# Patient Record
Sex: FEMALE | Race: WHITE | NOT HISPANIC OR LATINO | Employment: STUDENT | URBAN - METROPOLITAN AREA
[De-identification: names, ages, dates, MRNs, and addresses within clinical notes are randomized per-mention and may not be internally consistent; named-entity substitution may affect disease eponyms.]

---
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
## 2019-01-03 ENCOUNTER — OFFICE VISIT (OUTPATIENT)
Dept: OBGYN | Facility: CLINIC | Age: 19
End: 2019-01-03
Payer: COMMERCIAL

## 2019-01-03 VITALS — SYSTOLIC BLOOD PRESSURE: 98 MMHG | WEIGHT: 127 LBS | DIASTOLIC BLOOD PRESSURE: 60 MMHG | BODY MASS INDEX: 22.32 KG/M2

## 2019-01-03 DIAGNOSIS — Z00.00 WELL WOMAN EXAM WITHOUT GYNECOLOGICAL EXAM: Primary | ICD-10-CM

## 2019-01-03 DIAGNOSIS — N94.6 DYSMENORRHEA: ICD-10-CM

## 2019-01-03 DIAGNOSIS — Z11.3 ROUTINE SCREENING FOR STI (SEXUALLY TRANSMITTED INFECTION): ICD-10-CM

## 2019-01-03 DIAGNOSIS — Z30.41 ENCOUNTER FOR SURVEILLANCE OF CONTRACEPTIVE PILLS: ICD-10-CM

## 2019-01-03 PROCEDURE — 99385 PREV VISIT NEW AGE 18-39: CPT | Performed by: ADVANCED PRACTICE MIDWIFE

## 2019-01-03 PROCEDURE — 87491 CHLMYD TRACH DNA AMP PROBE: CPT | Performed by: ADVANCED PRACTICE MIDWIFE

## 2019-01-03 PROCEDURE — 87591 N.GONORRHOEAE DNA AMP PROB: CPT | Performed by: ADVANCED PRACTICE MIDWIFE

## 2019-01-03 NOTE — NURSING NOTE
"Chief Complaint   Patient presents with     Contraception     refill       Initial BP 98/60 (BP Location: Right arm, Cuff Size: Adult Regular)   Wt 57.6 kg (127 lb)   LMP 2018   BMI 22.32 kg/m   Estimated body mass index is 22.32 kg/m  as calculated from the following:    Height as of 18: 1.607 m (5' 3.25\").    Weight as of this encounter: 57.6 kg (127 lb).  BP completed using cuff size: regular    Questioned patient about current smoking habits.  Pt. has never smoked.          The following HM Due: NONE      Laury Pizano CMA                "

## 2019-01-03 NOTE — PROGRESS NOTES
Anushka is a 19 year old  female who presents for annual exam.     Besides routine health maintenance, she has no other health concerns today .  HPI:  Here for annual exam. Requesting KAYLEIGH renewal, planning to study abroad for 6 mo.  No other health concerns.   Menses are regular q 28-30 days and normal lasting 4- 5 days.   Menses flow: normal.    Patient's last menstrual period was 2018..   Using oral contraceptives for contraception.    She is not currently considering pregnancy.    REPRODUCTIVE/GYNECOLOGIC HISTORY:  Anushka is sexually active with male partner(s) and is currently in monogamous relationship.   STI testing offered?  Accepted  History of abnormal Pap smear:  No  SOCIAL HISTORY  Abuse: does not report having previously been physical or sexually abused.    Do you feel safe in your environment? YES    She  reports that  has never smoked. she has never used smokeless tobacco.      Last PHQ-9 score on record =   PHQ-9 SCORE 2018   PHQ-9 Total Score 0     Last GAD7 score on record = No flowsheet data found.  Alcohol Score = RARE     HEALTH MAINTENANCE:  Cholesterol: (No results found for: CHOL History of abnormal lipids: No  Mammo: na . History of abnormal Mammo: Not applicable.  Regular Self Breast Exams: Yes  TSH: (No results found for: TSH )  Pap; (No results found for: PAP )  Immunizations up to date: no, declines flu   (Gardasil, Tdap, Flu)  Health maintenance updated:  yes    HEALTHY HABITS  Eating habits: follows a balanced nutrition diet  Calcium/Vitamin D intake: source:  dairy   Exercise: How often do you exercise? 1-3 times/week;Walking, Cardio and Strength Training  Have you had an eye exam in the last two years? YES  Do you routinely see the dentist once or twice yearly? YES      HISTORY:  Obstetric History       T0      L0     SAB0   TAB0   Ectopic0   Multiple0   Live Births0         Past Medical History:   Diagnosis Date     NO ACTIVE PROBLEMS      No past  surgical history on file.  Family History   Problem Relation Age of Onset     Family History Negative Mother      Depression Father      Diabetes Father         type 2 dx age 44     Social History     Socioeconomic History     Marital status: Single     Spouse name: None     Number of children: None     Years of education: None     Highest education level: None   Social Needs     Financial resource strain: None     Food insecurity - worry: None     Food insecurity - inability: None     Transportation needs - medical: None     Transportation needs - non-medical: None   Occupational History     None   Tobacco Use     Smoking status: Never Smoker     Smokeless tobacco: Never Used   Substance and Sexual Activity     Alcohol use: No     Drug use: No     Sexual activity: Yes     Birth control/protection: Condom, Pill     Comment: carola may 2018   Other Topics Concern     Parent/sibling w/ CABG, MI or angioplasty before 65F 55M? Not Asked   Social History Narrative     None       Current Outpatient Medications:      Acetaminophen (TYLENOL PO), Take  by mouth., Disp: , Rfl:      norethindrone-ethinyl estradiol (BALZIVA) 0.4-35 MG-MCG per tablet, Take 1 tablet by mouth daily, Disp: 84 tablet, Rfl: 0     Allergies   Allergen Reactions     No Known Allergies        Past medical, surgical, social and family history were reviewed and updated in [facility].    ROS:   CONSTITUTIONAL: NEGATIVE for fever, chills, change in weight  INTEGUMENTARU/SKIN: NEGATIVE for worrisome rashes, moles or lesions  EYES: NEGATIVE for vision changes or irritation  ENT: NEGATIVE for ear, mouth and throat problems  RESP: NEGATIVE for significant cough or SOB  BREAST: NEGATIVE for masses, tenderness or discharge  CV: NEGATIVE for chest pain, palpitations or peripheral edema  GI: NEGATIVE for nausea, abdominal pain, heartburn, or change in bowel habits  : NEGATIVE for unusual urinary or vaginal symptoms. Periods are regular.  MUSCULOSKELETAL: NEGATIVE for  significant arthralgias or myalgia  NEURO: NEGATIVE for weakness, dizziness or paresthesias  PSYCHIATRIC: NEGATIVE for changes in mood or affect    PHYSICAL EXAM:  BP 98/60 (BP Location: Right arm, Cuff Size: Adult Regular)   Wt 57.6 kg (127 lb)   LMP 12/24/2018   BMI 22.32 kg/m     BMI: Body mass index is 22.32 kg/m .  Constitutional: healthy, alert and no distress  Neck: symmetrical, thyroid normal size, no masses present, no lymphadenopathy present.   Cardiovascular: RRR, no murmurs present  Respiratory: breathing unlabored, lungs CTA bilaterally  Breast: deferred   Gastrointestinal: abdomen soft, non-tender, bowel sounds present  PELVIC EXAM:  Deferred     ASSESSMENT/PLAN:    Return to clinic 1 year for well woman exam.   (Z00.00) Well woman exam without gynecological exam  (primary encounter diagnosis)  Comment: wnl  Plan: return to clinic 1 year     (Z11.3) Routine screening for STI (sexually transmitted infection)  Comment: wnl  Plan: Chlamydia trachomatis PCR, Neisseria         gonorrhoeae PCR        Results pending     (N94.6) Dysmenorrhea  Comment: refilled   Plan: norethindrone-ethinyl estradiol (BALZIVA)         0.4-35 MG-MCG tablet            (Z30.41) Encounter for surveillance of contraceptive pills  Comment: stable on current KAYLEIGH, not interested in changing methods   Plan: refilled   -6 mo prescription given for studying abroad, instructed to call clinic for refill for following 6 mo once she returns       COUNSELING:   Reviewed preventive health counseling, as reflected in patient instructions       Regular exercise       Healthy diet/nutrition   reports that  has never smoked. she has never used smokeless tobacco.      Karin Ma, DNP, APRN, CNM

## 2019-04-05 DIAGNOSIS — N94.6 DYSMENORRHEA: ICD-10-CM

## 2019-04-05 NOTE — TELEPHONE ENCOUNTER
balziva      Last Written Prescription Date:  1/3/19  Last Fill Quantity: 168,   # refills: 0  Last Office Visit: 1/3/19  Future Office visit:

## 2019-04-05 NOTE — TELEPHONE ENCOUNTER
"Requested Prescriptions   Pending Prescriptions Disp Refills     norethindrone-ethinyl estradiol (BALZIVA) 0.4-35 MG-MCG tablet 168 tablet 1     Sig: Take 1 tablet by mouth daily    Contraceptives Protocol Passed - 4/5/2019  1:39 PM       Passed - Patient is not a current smoker if age is 35 or older       Passed - Recent (12 mo) or future (30 days) visit within the authorizing provider's specialty    Patient had office visit in the last 12 months or has a visit in the next 30 days with authorizing provider or within the authorizing provider's specialty.  See \"Patient Info\" tab in inbasket, or \"Choose Columns\" in Meds & Orders section of the refill encounter.             Passed - Medication is active on med list       Passed - No active pregnancy on record       Passed - No positive pregnancy test in past 12 months        Filled per prtocol.    Kaitlyn HERNANDEZ R.N.  [facility]    "

## 2020-02-10 ENCOUNTER — HEALTH MAINTENANCE LETTER (OUTPATIENT)
Age: 20
End: 2020-02-10

## 2020-03-13 ENCOUNTER — OFFICE VISIT (OUTPATIENT)
Dept: OBGYN | Facility: CLINIC | Age: 20
End: 2020-03-13
Payer: COMMERCIAL

## 2020-03-13 VITALS — DIASTOLIC BLOOD PRESSURE: 70 MMHG | BODY MASS INDEX: 23.2 KG/M2 | WEIGHT: 132 LBS | SYSTOLIC BLOOD PRESSURE: 112 MMHG

## 2020-03-13 DIAGNOSIS — Z01.419 WELL WOMAN EXAM WITH ROUTINE GYNECOLOGICAL EXAM: Primary | ICD-10-CM

## 2020-03-13 DIAGNOSIS — N94.6 DYSMENORRHEA: ICD-10-CM

## 2020-03-13 DIAGNOSIS — Z30.09 BIRTH CONTROL COUNSELING: ICD-10-CM

## 2020-03-13 PROCEDURE — 99395 PREV VISIT EST AGE 18-39: CPT | Performed by: ADVANCED PRACTICE MIDWIFE

## 2020-03-13 RX ORDER — NORETHINDRONE AND ETHINYL ESTRADIOL 0.4-0.035
1 KIT ORAL DAILY
Qty: 84 TABLET | Refills: 3 | Status: SHIPPED | OUTPATIENT
Start: 2020-03-13 | End: 2020-05-29

## 2020-03-13 NOTE — PROGRESS NOTES
Anushka is a 20 year old  female who presents for annual exam.     Besides routine health maintenance,  she would like to discuss birth control options.    HPI: Here for annual. On COCs but having side effects. Would like to discuss IUD options. Due for 1st pap.   Did receive the HPV vaccine series.       GYNECOLOGIC HISTORY:    No LMP recorded.    Regular menses? yes  Menses every 28 days.  Length of menses: 3 days, reports light. Reports before birth control a little heavier with cramping.   Her current contraception method is: oral contraceptives.  She  reports that she has never smoked. She has never used smokeless tobacco.    Patient is sexually active.  STD testing offered?  Accepted   Last PHQ-9 score on record =   PHQ-9 SCORE 2018   PHQ-9 Total Score 0     Last GAD7 score on record = No flowsheet data found.  Alcohol Score = 1 time per week     HEALTH MAINTENANCE:  Cholesterol: (No results found for: CHOL   Last Mammo: Not applicable, Result: Not applicable,   Pap: (No results found for: PAP )  Due       Health maintenance updated:  yes    HISTORY:  OB History    Para Term  AB Living   0 0 0 0 0 0   SAB TAB Ectopic Multiple Live Births   0 0 0 0 0       Patient Active Problem List   Diagnosis     Encounter for other contraceptive management for dysmenorrhea     Dysmenorrhea     Screen for STD (sexually transmitted disease)     No past surgical history on file.   Social History     Tobacco Use     Smoking status: Never Smoker     Smokeless tobacco: Never Used   Substance Use Topics     Alcohol use: No      Problem (# of Occurrences) Relation (Name,Age of Onset)    Depression (1) Father    Diabetes (1) Father: type 2 dx age 44    Family History Negative (1) Mother            Current Outpatient Medications   Medication Sig     Acetaminophen (TYLENOL PO) Take  by mouth.     norethindrone-ethinyl estradiol (BALZIVA) 0.4-35 MG-MCG tablet Take 1 tablet by mouth daily     No current  facility-administered medications for this visit.      Allergies   Allergen Reactions     No Known Allergies        Past medical, surgical, social and family histories were reviewed and updated in [facility].    ROS:   12 point review of systems negative other than symptoms noted below or in the HPI.      EXAM:  There were no vitals taken for this visit.   BMI: There is no height or weight on file to calculate BMI.    PHYSICAL EXAM:  Constitutional:   Appearance: Well nourished, well developed, alert, in no acute distress  Neck:  Lymph Nodes:  No lymphadenopathy present    Thyroid:  Gland size normal, nontender, no nodules or masses present  on palpation  Chest:  Respiratory Effort:  Breathing unlabored  Cardiovascular:    Heart: Auscultation:  Regular rate, normal rhythm, no murmurs present  Breasts: Inspection of Breasts:  No lymphadenopathy present., Palpation of Breasts and Axillae:  No masses present on palpation, no breast tenderness. and Axillary Lymph Nodes:  No lymphadenopathy present.  Gastrointestinal:   Abdominal Examination:  Abdomen nontender to palpation, tone normal without rigidity or guarding, no masses present, umbilicus without lesions   Liver and Spleen:  No hepatomegaly present, liver nontender to palpation    Hernias:  No hernias present  Lymphatic: Lymph Nodes:  No other lymphadenopathy present  Skin:  General Inspection:  No rashes present, no lesions present, no areas of  discoloration  Neurologic:    Mental Status:  Oriented X3.  Normal strength and tone, sensory exam                grossly normal, mentation intact and speech normal.    Psychiatric:   Mentation appears normal and affect normal/bright.         No Pelvic Exam performed    COUNSELING:   Reviewed preventive health counseling, as reflected in patient instructions       Contraception    BMI: There is no height or weight on file to calculate BMI.      ASSESSMENT:  20 year old female with satisfactory annual exam.  (Z01.419) Well woman  exam with routine gynecological exam  (primary encounter diagnosis)  Comment: wnl  Plan: norethindrone-ethinyl estradiol (BALZIVA)         0.4-35 MG-MCG tablet, CANCELED: Pap imaged thin        layer screen only - recommended age 21 - 24         years         (N94.6) Dysmenorrhea  Plan: norethindrone-ethinyl estradiol (BALZIVA)         0.4-35 MG-MCG tablet        Refilled     (Z30.09) Birth control counseling  Comment:   -Discussed IUD options, reviewed hormonal vs non hormonal, risks, benefits, side effects and procedure.   -Briefly discussed Nexplanon, Depo, Nuva Ring.     Plan:  -Refill KAYLEIGH today until IUD insertion   -Handouts given for Mirena, Kyleena and patricia. Patient most interested in patricia.   -return to clinic for IUD insertion premedicated ibuprofen   -Pap and STI screening not completed today, will complete at IUD insertion Patient agrees with plan       SARKIS Jimenes CNM

## 2020-03-13 NOTE — NURSING NOTE
"Chief Complaint   Patient presents with     Physical     Contraception     Discuss birth control options, possibly an IUD       Initial /70 (BP Location: Right arm, Cuff Size: Adult Regular)   Wt 59.9 kg (132 lb)   LMP 03/10/2020 (Exact Date)   Breastfeeding No   BMI 23.20 kg/m   Estimated body mass index is 23.2 kg/m  as calculated from the following:    Height as of 18: 1.607 m (5' 3.25\").    Weight as of this encounter: 59.9 kg (132 lb).  BP completed using cuff size: regular    Questioned patient about current smoking habits.  Pt. has never smoked.          The following HM Due: pap smear      Spencer Clements CMA    "

## 2020-05-29 ENCOUNTER — OFFICE VISIT (OUTPATIENT)
Dept: OBGYN | Facility: CLINIC | Age: 20
End: 2020-05-29
Payer: COMMERCIAL

## 2020-05-29 DIAGNOSIS — Z30.430 ENCOUNTER FOR INSERTION OF INTRAUTERINE CONTRACEPTIVE DEVICE: Primary | ICD-10-CM

## 2020-05-29 DIAGNOSIS — Z30.430 ENCOUNTER FOR IUD INSERTION: ICD-10-CM

## 2020-05-29 DIAGNOSIS — Z12.4 PAPANICOLAOU SMEAR FOR CERVICAL CANCER SCREENING: ICD-10-CM

## 2020-05-29 DIAGNOSIS — Z11.3 ROUTINE SCREENING FOR STI (SEXUALLY TRANSMITTED INFECTION): ICD-10-CM

## 2020-05-29 PROCEDURE — 87491 CHLMYD TRACH DNA AMP PROBE: CPT | Performed by: ADVANCED PRACTICE MIDWIFE

## 2020-05-29 PROCEDURE — G0145 SCR C/V CYTO,THINLAYER,RESCR: HCPCS | Performed by: ADVANCED PRACTICE MIDWIFE

## 2020-05-29 PROCEDURE — 87591 N.GONORRHOEAE DNA AMP PROB: CPT | Performed by: ADVANCED PRACTICE MIDWIFE

## 2020-05-29 PROCEDURE — G0124 SCREEN C/V THIN LAYER BY MD: HCPCS | Performed by: ADVANCED PRACTICE MIDWIFE

## 2020-05-29 PROCEDURE — 58300 INSERT INTRAUTERINE DEVICE: CPT | Performed by: ADVANCED PRACTICE MIDWIFE

## 2020-05-29 NOTE — PATIENT INSTRUCTIONS
What Kyleena/Mirena Users May Expect    What to watch for right after Kyleena/Mirena is placed  Some women may experience uterine cramps, bleeding, and/or dizziness during and right after Mirena is placed. To help minimize the cramps, you may taken ibuprofen 600 mg with food prior to your appointment. These symptoms should improve over the next 24 hours.  Mild cramping may be present for a few days after your placement  As a follow up, you should check your strings on 4 weeks or visit your clinic once in the first 4 to 12 weeks after Kyleena/Mirena is placed to make sure it is in the right position. After that, Kyleena/Mirena can be checked once a year as part of your routine exam.    Please use a back-up method (abstinence or condoms) for 5 days after placement.    Your periods may change  For the first 3 to 6 months, your monthly period may become irregular. You may also have frequent spotting or light bleeding. A few women have heavy bleeding during this time. After your body adjusts, the number of bleeding days is likely to decrease (but may remain irregular), and you may even find that your periods stop altogether for as long as Kyleena/Mirena is in place. Around the end of the third month of use, you may see up to a 75% reduction in the amount of menstrual bleeding. By one year, about 1 out of 5 users may hay have no period at all. At the end of two years, 70% have little or no bleeding. Your periods will return rapidly once Kyleena/Mirena is removed.     Mirena Strings  You may check your own Kyleena/Mirena strings by inserting a finger into the vagina and feeling the strings as they exit the cervix.  The strings will initially feel firm, like fishing line, but will soften over a few weeks.  After the strings have softened, you or your partner should not be able to feel the strings during intercourse.  If you can feel the IUD, see your healthcare provider to have the position confirmed.  You may use tampons  with Kyleena/Mirena in place.    Kyleena/Mirena does not protect against HIV or STDs.  Kyleena/Mirena does not prevent the formation of ovarian cysts.  Kyleena/Mirena does not typically reduce acne or cause weight gain or mood changes.      For more information:  [email or web address]

## 2020-05-29 NOTE — NURSING NOTE
"Chief Complaint   Patient presents with     Contraception     Kyleena IUD insertion       Initial /78 (BP Location: Left arm, Cuff Size: Adult Regular)   Wt 81.2 kg (179 lb)   LMP 2020 (Exact Date)   Breastfeeding No   BMI 31.46 kg/m   Estimated body mass index is 31.46 kg/m  as calculated from the following:    Height as of 18: 1.607 m (5' 3.25\").    Weight as of this encounter: 81.2 kg (179 lb).  BP completed using cuff size: regular    Questioned patient about current smoking habits.  Pt. has never smoked.          The following HM Due: pap smear  Thuan ()    Spencer Clements CMA    "

## 2020-06-02 ENCOUNTER — PATIENT OUTREACH (OUTPATIENT)
Dept: OBGYN | Facility: CLINIC | Age: 20
End: 2020-06-02

## 2020-06-02 LAB
COPATH REPORT: ABNORMAL
PAP: ABNORMAL

## 2020-06-02 NOTE — PROGRESS NOTES
IUD Insertion:  CONSULT:    Is a pregnancy test required: No.  Was a consent obtained?  Yes    Subjective: Anushka Lewis is a 20 year old  presents for IUD as counseled during her annual exam this past March. After a brief review of different types of IUDs, patient desires Kyleena.    Patient has been given the opportunity to ask questions about all forms of birth control, including all options appropriate for Anushka Lewis. Discussed that no method of birth control, except abstinence is 100% effective against pregnancy or sexually transmitted infection.     Anushka Lewis understands she may have the IUD removed at any time. IUD should be removed by a health care provider.    The entire insertion procedure was reviewed with the patient, including care after placement.    Patient's last menstrual period was 2020 (exact date). Last sexual activity: 20. Last dose of active birth control pill: 20. Currently on placebo week. No allergy to betadine or shellfish. Patient desires STD screening. Also due for first Pap today.  No results found for: HCG      /78 (BP Location: Left arm, Cuff Size: Adult Regular)   Wt 81.2 kg (179 lb)   LMP 2020 (Exact Date)   Breastfeeding No   BMI 31.46 kg/m      Pelvic Exam:   EG/BUS: Normal genital architecture without lesions, erythema or abnormal secretions.   Vagina: Moist, pink, rugae with physiologic discharge and secretions  Cervix: Nulliparous, pink, moist, without lesion or CMT  Uterus: Midposition, mobile, no pain  Adnexa: within normal limits and no masses, nodularity, tenderness    PROCEDURE NOTE: -- IUD Insertion    Reason for Insertion: contraception    Patient premedicated with ibuprofen prior to today's appointment.  Under sterile technique, cervix was visualized with speculum and prepped with Betadine solution swab x 3. Tenaculum was placed for stability. The uterus was gently straightened and sounded to 7.0 cm. IUD prepared  for placement, and IUD inserted according to 's instructions without difficulty or significant resitance, and deployed at the fundus. The strings were visualized and trimmed to 3.0 cm from the external os. Tenaculum was removed and hemostasis noted. Speculum removed.  Patient tolerated procedure well.    Lot # [id number]  Exp: FEB 2022    EBL: minimal    Complications: none    ASSESSMENT:     ICD-10-CM    1. Encounter for insertion of intrauterine contraceptive device  Z30.430 levonorgestrel (KYLEENA) 19.5 MG IUD     levonorgestrel (KYLEENA) 19.5 MG IUD 19.5 mg     INSERTION INTRAUTERINE DEVICE     levonorgestrel (KYLEENA) 19.5 MG IUD     levonorgestrel (KYLEENA) 19.5 MG IUD 19.5 mg   2. Routine screening for STI (sexually transmitted infection)  Z11.3 NEISSERIA GONORRHOEA PCR     CHLAMYDIA TRACHOMATIS PCR   3. Papanicolaou smear for cervical cancer screening  Z12.4 Pap imaged thin layer screen only - recommended age 21 - 24 years   4. Kyleena IUD inserted: 5/29/2020; Removal: 5/29/2025  Z30.430         PLAN:    Patient given handouts, including when to have IUD removed, list of danger s/sx, side effects and follow up recommended. Encouraged condom use for prevention of STD. Advised to call for any fever, for prolonged or severe pain or bleeding, abnormal vaginal discharge, or unable to palpate strings. She was advised to use pain medications (ibuprofen) as needed for mild to moderate pain. Advised to follow-up in clinic in 4-6 weeks for IUD string check if unable to find strings or as directed by provider.     SARKIS Monsivais CNM

## 2020-06-02 NOTE — TELEPHONE ENCOUNTER
5/29/20 ASCUS Pap at 21 yo. Plan pap only in 1 year.   Result released to [employer].   Amber Stewart RN  Pap Tracking

## 2020-11-16 ENCOUNTER — HEALTH MAINTENANCE LETTER (OUTPATIENT)
Age: 20
End: 2020-11-16

## 2020-12-29 ENCOUNTER — OFFICE VISIT (OUTPATIENT)
Dept: DERMATOLOGY | Facility: CLINIC | Age: 20
End: 2020-12-29
Payer: COMMERCIAL

## 2020-12-29 VITALS — DIASTOLIC BLOOD PRESSURE: 70 MMHG | OXYGEN SATURATION: 99 % | SYSTOLIC BLOOD PRESSURE: 104 MMHG | HEART RATE: 65 BPM

## 2020-12-29 DIAGNOSIS — L91.0 KELOID CICATRIX: ICD-10-CM

## 2020-12-29 DIAGNOSIS — D48.5 NEOPLASM OF UNCERTAIN BEHAVIOR OF SKIN: Primary | ICD-10-CM

## 2020-12-29 PROCEDURE — 11900 INJECT SKIN LESIONS </W 7: CPT | Mod: 51 | Performed by: PHYSICIAN ASSISTANT

## 2020-12-29 PROCEDURE — 99203 OFFICE O/P NEW LOW 30 MIN: CPT | Mod: 25 | Performed by: PHYSICIAN ASSISTANT

## 2020-12-29 PROCEDURE — 11102 TANGNTL BX SKIN SINGLE LES: CPT | Performed by: PHYSICIAN ASSISTANT

## 2020-12-29 PROCEDURE — 88305 TISSUE EXAM BY PATHOLOGIST: CPT | Performed by: DERMATOLOGY

## 2020-12-29 NOTE — PROGRESS NOTES
HPI:   Chief complaints: Anushka Lewis is a 20 year old female who presents for evaluation of a keloid scar on the left ear lobe. The area has been present for about 1 year and is irritated. It occurred after an ear piercing. She has never had keloids in the past.     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 She will be getting her masters in nursing in the future.     Review Of Systems  Eyes: negative  Ears/Nose/Throat: negative  Respiratory: No shortness of breath, dyspnea on exertion, cough, or hemoptysis  Cardiovascular: negative  Gastrointestinal: negative  Genitourinary: negative  Musculoskeletal: negative  Neurologic: negative  Psychiatric: negative  Skin: positive for lesion      PHYSICAL EXAM:    /70   Pulse 65   LMP 12/28/2020 (Exact Date)   SpO2 99%   Skin exam performed as follows: Type 2 skin. Mood appropriate  Alert and Oriented X 3. Well developed, well nourished in no distress.  General appearance: Normal  Head including face: Normal  Eyes: conjunctiva and lids: Normal  Mouth: Lips, teeth, gums: Normal  Neck: Normal  Chest-breast/axillae: Normal  Back: Normal  Spleen and liver: Normal  Cardiovascular: Exam of peripheral vascular system by observation for swelling, varicosities, edema: Normal  Genitalia: groin, buttocks: Normal  Extremities: digits/nails (clubbing): Normal  Eccrine and Apocrine glands: Normal  Right upper extremity: Normal  Left upper extremity: Normal  Right lower extremity: Normal  Left lower extremity: Normal  Skin: Scalp and body hair: See below    1. Firm pink papule on the left posterior ear lobe 8 mm in size   2. Small hypertrophic papule on the right ear lobe    ASSESSMENT/PLAN:     1. Button keloid cicatrix on the left posterior ear lobe - discussed excision with ILK and she is amenable to this. Discussed returning every 2-4 weeks for ILK injections to ensure keloid does not recur.   --Kenalog  10 mg/cc injected to ear lobe. Total of 0.5 cc's used.  Advised on risk of atrophy and failure to respond. Advised on aftercare.   --Shave biopsy performed.  Area cleaned and anesthetized with 1% lidocaine with epinephrine.  Dermablade used to remove the lesion and sent to pathology. Bleeding was cauterized. Pt tolerated procedure well with no complications.     2. Small cicatrix on the right ear lobe - not bothersome to patient        Follow-up: 2 weeks  CC:   Scribed By: Deidre Augustin MS, PAMichelleC

## 2020-12-29 NOTE — PATIENT INSTRUCTIONS
Wound Care Instructions     FOR SUPERFICIAL WOUNDS     [facility] [phone]                 AFTER 24 HOURS YOU SHOULD REMOVE THE BANDAGE AND BEGIN DAILY DRESSING CHANGES AS FOLLOWS:     1) Remove Dressing.     2) Clean and dry the area with tap water using a Q-tip or sterile gauze pad.     3) Apply Vaseline, Aquaphor, Polysporin ointment or Bacitracin ointment over entire wound.  Do NOT use Neosporin ointment.     4) Cover the wound with a band-aid, or a sterile non-stick gauze pad and micropore paper tape    REPEAT THESE INSTRUCTIONS AT LEAST ONCE A DAY UNTIL THE WOUND HAS COMPLETELY HEALED.    It is an old wives tale that a wound heals better when it is exposed to air and allowed to dry out. The wound will heal faster with a better cosmetic result if it is kept moist with ointment and covered with a bandage.    **Do not let the wound dry out.**    Supplies Needed:      *Cotton tipped applicators (Q-tips)    *Vaseline, Aquaphor, Polysporin or Bacitracin Ointment (NOT NEOSPORIN)    *Band-aids or non-stick gauze pads and micropore paper tape.      PATIENT INFORMATION:    During the healing process you will notice a number of changes. All wounds develop a small halo of redness surrounding the wound.  This means healing is occurring. Severe itching with extensive redness usually indicates sensitivity to the ointment or bandage tape used to dress the wound.  You should call our office if this develops.      Swelling  and/or discoloration around your surgical site is common, particularly when performed around the eye.    All wounds normally drain.  The larger the wound the more drainage there will be.  After 7-10 days, you will notice the wound beginning to shrink and new skin will begin to grow.  The wound is healed when you can see skin has formed over the entire area.  A healed wound has a healthy, shiny look to the surface and is red to dark pink in color to normalize.  Wounds may take approximately  4-6 weeks to heal.  Larger wounds may take 6-8 weeks.  After the wound is healed you may discontinue dressing changes.    You may experience a sensation of tightness as your wound heals. This is normal and will gradually subside.    Your healed wound may be sensitive to temperature changes. This sensitivity improves with time, but if you re having a lot of discomfort, try to avoid temperature extremes.    Patients frequently experience itching after their wound appears to have healed because of the continue healing under the skin.  Plain Vaseline will help relieve the itching.      POSSIBLE COMPLICATIONS    BLEEDIN. Leave the bandage in place.  2. Use tightly rolled up gauze or a cloth to apply direct pressure over the bandage for 30  minutes.  3. Reapply pressure for an additional 30 minutes if necessary  4. Use additional gauze and tape to maintain pressure once the bleeding has stopped.

## 2020-12-29 NOTE — LETTER
12/29/2020         RE: Anushka Lewis  [address] [facility] [address]        Dear Colleague,    Thank you for referring your patient, Anushka Lewis, to the [facility]. Please see a copy of my visit note below.    HPI:   Chief complaints: Anushka Lewis is a 20 year old female who presents for evaluation of a keloid scar on the left ear lobe. The area has been present for about 1 year and is irritated. It occurred after an ear piercing. She has never had keloids in the past.     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     Review Of Systems  Eyes: negative  Ears/Nose/Throat: negative  Respiratory: No shortness of breath, dyspnea on exertion, cough, or hemoptysis  Cardiovascular: negative  Gastrointestinal: negative  Genitourinary: negative  Musculoskeletal: negative  Neurologic: negative  Psychiatric: negative  Skin: positive for lesion      PHYSICAL EXAM:    /70   Pulse 65   LMP 12/28/2020 (Exact Date)   SpO2 99%   Skin exam performed as follows: Type 2 skin. Mood appropriate  Alert and Oriented X 3. Well developed, well nourished in no distress.  General appearance: Normal  Head including face: Normal  Eyes: conjunctiva and lids: Normal  Mouth: Lips, teeth, gums: Normal  Neck: Normal  Chest-breast/axillae: Normal  Back: Normal  Spleen and liver: Normal  Cardiovascular: Exam of peripheral vascular system by observation for swelling, varicosities, edema: Normal  Genitalia: groin, buttocks: Normal  Extremities: digits/nails (clubbing): Normal  Eccrine and Apocrine glands: Normal  Right upper extremity: Normal  Left upper extremity: Normal  Right lower extremity: Normal  Left lower extremity: Normal  Skin: Scalp and body hair: See below    1. Firm pink papule on the left posterior ear lobe 8 mm in size   2. Small hypertrophic  papule on the right ear lobe    ASSESSMENT/PLAN:     1. Button keloid cicatrix on the left posterior ear lobe - discussed excision with ILK and she is amenable to this. Discussed returning every 2-4 weeks for ILK injections to ensure keloid does not recur.   --Kenalog 10 mg/cc injected to ear lobe. Total of 0.5 cc's used.  Advised on risk of atrophy and failure to respond. Advised on aftercare.   --Shave biopsy performed.  Area cleaned and anesthetized with 1% lidocaine with epinephrine.  Dermablade used to remove the lesion and sent to pathology. Bleeding was cauterized. Pt tolerated procedure well with no complications.     2. Small cicatrix on the right ear lobe - not bothersome to patient        Follow-up: 2 weeks  CC:   Scribed By: Deidre Augustin, MS, CURRY          Again, thank you for allowing me to participate in the care of your patient.        Sincerely,        Deidre Augustin PA-C

## 2021-01-06 LAB — COPATH REPORT: NORMAL

## 2021-01-15 ENCOUNTER — OFFICE VISIT (OUTPATIENT)
Dept: DERMATOLOGY | Facility: CLINIC | Age: 21
End: 2021-01-15
Payer: COMMERCIAL

## 2021-01-15 VITALS — HEART RATE: 78 BPM | OXYGEN SATURATION: 98 % | DIASTOLIC BLOOD PRESSURE: 69 MMHG | SYSTOLIC BLOOD PRESSURE: 104 MMHG

## 2021-01-15 DIAGNOSIS — L91.0 KELOID CICATRIX: Primary | ICD-10-CM

## 2021-01-15 PROCEDURE — 11900 INJECT SKIN LESIONS </W 7: CPT | Performed by: PHYSICIAN ASSISTANT

## 2021-01-15 PROCEDURE — 99207 PR NO CHARGE LOS: CPT | Performed by: PHYSICIAN ASSISTANT

## 2021-01-15 NOTE — LETTER
1/15/2021         RE: Anushka Lewis  [address] [facility] [address]        Dear Colleague,    Thank you for referring your patient, Anushka Lewis, to the [facility]. Please see a copy of my visit note below.    HPI:   Chief complaints: Anushka Lewis is a 21 year old female who presents for recheck of the left earlobe post keloid removal. She reports that the area has been healing well.         PHYSICAL EXAM:    /69   Pulse 78   LMP 12/28/2020 (Exact Date)   SpO2 98%   Skin exam performed as follows: Type 2 skin. Mood appropriate  Alert and Oriented X 3. Well developed, well nourished in no distress.  General appearance: Normal  Head including face: Normal  Eyes: conjunctiva and lids: Normal  Mouth: Lips, teeth, gums: Normal  Neck: Normal  Chest-breast/axillae: Normal  Back: Normal  Spleen and liver: Normal  Cardiovascular: Exam of peripheral vascular system by observation for swelling, varicosities, edema: Normal  Genitalia: groin, buttocks: Normal  Extremities: digits/nails (clubbing): Normal  Eccrine and Apocrine glands: Normal  Right upper extremity: Normal  Left upper extremity: Normal  Right lower extremity: Normal  Left lower extremity: Normal  Skin: Scalp and body hair: See below    1. Left earlobe with healing sore; palpable scar tissue  2. Right ear lobe with small keloid cicatrix    ASSESSMENT/PLAN:     1. 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 There is palpable scar tissue still.   --Kenalog 40 mg/cc injected to left earlobe, right earlobe. Total of 0.5 cc's used.  Advised on risk of atrophy and failure to respond. Advised on aftercare.         Follow-up: 1 month for ILK   CC:   Scribed By: Deidre Augustin, MS, PADARIO          Again, thank you for allowing me to participate in the care of your patient.        Sincerely,        Deidre Augustin PA-C

## 2021-01-17 RX ORDER — TRIAMCINOLONE ACETONIDE 40 MG/ML
40 INJECTION, SUSPENSION INTRA-ARTICULAR; INTRAMUSCULAR ONCE
Status: ACTIVE | OUTPATIENT
Start: 2021-01-17

## 2021-01-17 NOTE — PROGRESS NOTES
HPI:   Chief complaints: Anushka Lewis is a 21 year old female who presents for recheck of the left earlobe post keloid removal. She reports that the area has been healing well.         PHYSICAL EXAM:    /69   Pulse 78   LMP 12/28/2020 (Exact Date)   SpO2 98%   Skin exam performed as follows: Type 2 skin. Mood appropriate  Alert and Oriented X 3. Well developed, well nourished in no distress.  General appearance: Normal  Head including face: Normal  Eyes: conjunctiva and lids: Normal  Mouth: Lips, teeth, gums: Normal  Neck: Normal  Chest-breast/axillae: Normal  Back: Normal  Spleen and liver: Normal  Cardiovascular: Exam of peripheral vascular system by observation for swelling, varicosities, edema: Normal  Genitalia: groin, buttocks: Normal  Extremities: digits/nails (clubbing): Normal  Eccrine and Apocrine glands: Normal  Right upper extremity: Normal  Left upper extremity: Normal  Right lower extremity: Normal  Left lower extremity: Normal  Skin: Scalp and body hair: See below    1. Left earlobe with healing sore; palpable scar tissue  2. Right ear lobe with small keloid cicatrix    ASSESSMENT/PLAN:     1. 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 There is palpable scar tissue still.   --Kenalog 40 mg/cc injected to left earlobe, right earlobe. Total of 0.5 cc's used.  Advised on risk of atrophy and failure to respond. Advised on aftercare.         Follow-up: 1 month for ILK   CC:   Scribed By: Deidre Augustin, MS, PADARIO

## 2021-02-26 ENCOUNTER — OFFICE VISIT (OUTPATIENT)
Dept: DERMATOLOGY | Facility: CLINIC | Age: 21
End: 2021-02-26
Payer: COMMERCIAL

## 2021-02-26 VITALS — DIASTOLIC BLOOD PRESSURE: 78 MMHG | BODY MASS INDEX: 22.67 KG/M2 | SYSTOLIC BLOOD PRESSURE: 118 MMHG | WEIGHT: 129 LBS

## 2021-02-26 DIAGNOSIS — L91.0 KELOID CICATRIX: Primary | ICD-10-CM

## 2021-02-26 PROCEDURE — 11900 INJECT SKIN LESIONS </W 7: CPT | Performed by: PHYSICIAN ASSISTANT

## 2021-02-26 PROCEDURE — 99207 PR DROP WITH A PROCEDURE: CPT | Performed by: PHYSICIAN ASSISTANT

## 2021-02-26 RX ORDER — TRIAMCINOLONE ACETONIDE 40 MG/ML
40 INJECTION, SUSPENSION INTRA-ARTICULAR; INTRAMUSCULAR ONCE
Status: ACTIVE | OUTPATIENT
Start: 2021-02-26

## 2021-02-26 NOTE — PROGRESS NOTES
HPI:   Chief complaints: Anushka Lewis is a 21 year old female who presents for recheck of the left earlobe post keloid removal. She reports that the area has been healing well.         PHYSICAL EXAM:    There were no vitals taken for this visit.  Skin exam performed as follows: Type 2 skin. Mood appropriate  Alert and Oriented X 3. Well developed, well nourished in no distress.  General appearance: Normal  Head including face: Normal  Eyes: conjunctiva and lids: Normal  Mouth: Lips, teeth, gums: Normal  Neck: Normal  Chest-breast/axillae: Normal  Back: Normal  Spleen and liver: Normal  Cardiovascular: Exam of peripheral vascular system by observation for swelling, varicosities, edema: Normal  Genitalia: groin, buttocks: Normal  Extremities: digits/nails (clubbing): Normal  Eccrine and Apocrine glands: Normal  Right upper extremity: Normal  Left upper extremity: Normal  Right lower extremity: Normal  Left lower extremity: Normal  Skin: Scalp and body hair: See below    1. Left earlobe with healing sore; palpable scar tissue  2. Right ear lobe with small keloid cicatrix    ASSESSMENT/PLAN:     1. 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 There is palpable scar tissue still; will inject again.   --Kenalog 40 mg/cc injected to left earlobe, right earlobe. Total of 0.5 cc's used.  Advised on risk of atrophy and failure to respond. Advised on aftercare.         Follow-up: 2-3 months for ILK   CC:   Scribed By: Deidre Augustin, MS, PADARIO

## 2021-02-26 NOTE — LETTER
2/26/2021         RE: Anushka Lewis  [address] [facility] [address]        Dear Colleague,    Thank you for referring your patient, Anushka Lewis, to the [facility]. Please see a copy of my visit note below.    HPI:   Chief complaints: Anushka Lewis is a 21 year old female who presents for recheck of the left earlobe post keloid removal. She reports that the area has been healing well.         PHYSICAL EXAM:    There were no vitals taken for this visit.  Skin exam performed as follows: Type 2 skin. Mood appropriate  Alert and Oriented X 3. Well developed, well nourished in no distress.  General appearance: Normal  Head including face: Normal  Eyes: conjunctiva and lids: Normal  Mouth: Lips, teeth, gums: Normal  Neck: Normal  Chest-breast/axillae: Normal  Back: Normal  Spleen and liver: Normal  Cardiovascular: Exam of peripheral vascular system by observation for swelling, varicosities, edema: Normal  Genitalia: groin, buttocks: Normal  Extremities: digits/nails (clubbing): Normal  Eccrine and Apocrine glands: Normal  Right upper extremity: Normal  Left upper extremity: Normal  Right lower extremity: Normal  Left lower extremity: Normal  Skin: Scalp and body hair: See below    1. Left earlobe with healing sore; palpable scar tissue  2. Right ear lobe with small keloid cicatrix    ASSESSMENT/PLAN:     1. 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 There is palpable scar tissue still; will inject again.   --Kenalog 40 mg/cc injected to left earlobe, right earlobe. Total of 0.5 cc's used.  Advised on risk of atrophy and failure to respond. Advised on aftercare.         Follow-up: 2-3 months for ILK   CC:   Scribed By: Deidre Augustin, MS, PADARIO          Again, thank you for allowing me to participate in the care of your patient.        Sincerely,        Deidre Augustin PA-C

## 2021-05-12 ENCOUNTER — PATIENT OUTREACH (OUTPATIENT)
Dept: OBGYN | Facility: CLINIC | Age: 21
End: 2021-05-12

## 2021-05-12 DIAGNOSIS — R87.610 ATYPICAL SQUAMOUS CELLS OF UNDETERMINED SIGNIFICANCE (ASCUS) ON PAPANICOLAOU SMEAR OF CERVIX: ICD-10-CM

## 2021-05-17 ENCOUNTER — OFFICE VISIT (OUTPATIENT)
Dept: OBGYN | Facility: CLINIC | Age: 21
End: 2021-05-17
Payer: COMMERCIAL

## 2021-05-17 VITALS — WEIGHT: 123 LBS | DIASTOLIC BLOOD PRESSURE: 72 MMHG | SYSTOLIC BLOOD PRESSURE: 106 MMHG | BODY MASS INDEX: 21.62 KG/M2

## 2021-05-17 DIAGNOSIS — R07.89 STERNUM PAIN: ICD-10-CM

## 2021-05-17 DIAGNOSIS — R87.610 ATYPICAL SQUAMOUS CELLS OF UNDETERMINED SIGNIFICANCE (ASCUS) ON PAPANICOLAOU SMEAR OF CERVIX: ICD-10-CM

## 2021-05-17 DIAGNOSIS — Z01.419 ENCOUNTER FOR GYNECOLOGICAL EXAMINATION WITHOUT ABNORMAL FINDING: Primary | ICD-10-CM

## 2021-05-17 PROCEDURE — 99395 PREV VISIT EST AGE 18-39: CPT | Performed by: ADVANCED PRACTICE MIDWIFE

## 2021-05-17 PROCEDURE — 88175 CYTOPATH C/V AUTO FLUID REDO: CPT | Performed by: ADVANCED PRACTICE MIDWIFE

## 2021-05-17 NOTE — PROGRESS NOTES
Anushka is a 21 year old  female who presents for annual exam.     HPI:    Besides routine health maintenance, she has concerns for sternal pain. She reports pain is in her mid chest, is sharp/shooting, and happens randomly throughout the day. She reports the pain seems to be positional and gets better with movement and breathing. She reports the pain started a week ago. She states that three weeks ago, she switched to working night shifts and has had some GI upset since that point. She is wondering if they are related. 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  Menses are irregular since IUD placement.       No LMP recorded. (Menstrual status: IUD)..   Using IUD - Kyleena for contraception.    She is not currently considering pregnancy.    REPRODUCTIVE/GYNECOLOGIC HISTORY:  Anushka is sexually active with male partner(s) and is currently in monogamous relationship.   STI testing offered?  Declined  History of abnormal Pap smear:  Yes    pap ASCUS with recommend rescreen in 1yr  SOCIAL HISTORY  Abuse: does not report having previously been physical or sexually abused.    Do you feel safe in your environment? YES       She  reports that she has never smoked. She has never used smokeless tobacco.      Last PHQ-9 score on record =   PHQ-9 SCORE 2018   PHQ-9 Total Score 0     Last GAD7 score on record = No flowsheet data found.  Alcohol Score = rare, drinks once a month    HEALTH MAINTENANCE:  Cholesterol: (No results found for: CHOL History of abnormal lipids: No  Mammo: NA  Regular Self Breast Exams: Yes  TSH: (No results found for: TSH )  Pap; (  Lab Results   Component Value Date    PAP ASC-US 2020    )      HEALTHY HABITS  Eating habits: eats regular meals  Calcium/Vitamin D intake: source:  dairy Adequate?   Exercise: How often do you exercise? 5-7 times/week;Cardio and Strength Training  Have you had an eye exam in the last two years? YES    Do you routinely see the  dentist once or twice yearly? YES         HISTORY:  OB History    Para Term  AB Living   0 0 0 0 0 0   SAB TAB Ectopic Multiple Live Births   0 0 0 0 0     Past Medical History:   Diagnosis Date     Abnormal Pap smear of cervix     see problem list     Past Surgical History:   Procedure Laterality Date     NO HISTORY OF SURGERY       Family History   Problem Relation Age of Onset     Family History Negative Mother      Depression Father      Diabetes Father         type 2 dx age 44     Social History     Socioeconomic History     Marital status: Single     Spouse name: None     Number of children: None     Years of education: None     Highest education level: None   Occupational History     None   Social Needs     Financial resource strain: None     Food insecurity     Worry: None     Inability: None     Transportation needs     Medical: None     Non-medical: None   Tobacco Use     Smoking status: Never Smoker     Smokeless tobacco: Never Used   Substance and Sexual Activity     Alcohol use: Yes     Drug use: No     Sexual activity: Yes     Birth control/protection: Condom, Pill     Comment: carola may 2018   Lifestyle     Physical activity     Days per week: None     Minutes per session: None     Stress: None   Relationships     Social connections     Talks on phone: None     Gets together: None     Attends [employer] service: None     Active member of club or organization: None     Attends meetings of clubs or organizations: None     Relationship status: None     Intimate partner violence     Fear of current or ex partner: None     Emotionally abused: None     Physically abused: None     Forced sexual activity: None   Other Topics Concern     Parent/sibling w/ CABG, MI or angioplasty before 65F 55M? Not Asked   Social History Narrative     None       Current Outpatient Medications:      Acetaminophen (TYLENOL PO), Take  by mouth., Disp: , Rfl:      levonorgestrel (KYLEENA) 19.5 MG IUD, 1 each by  Intrauterine route once Removal: 5/29/2025, Disp:  , Rfl:      levonorgestrel (KYLEENA) 19.5 MG IUD, 1 each (19.5 mg) by Intrauterine route once, Disp:  , Rfl:     Current Facility-Administered Medications:      triamcinolone (KENALOG-40) injection 40 mg, 40 mg, Intra-Lesional, Once, Demetria, Deidre T, PA-C     triamcinolone (KENALOG-40) injection 40 mg, 40 mg, Intra-Lesional, Once, Demetria, Deidre T, PA-C     triamcinolone acetonide (KENALOG-10) injection 10 mg, 10 mg, Intra-Lesional, Once, Demetria, Deidre T, PA-C     Allergies   Allergen Reactions     No Known Allergies        Past medical, surgical, social and family history were reviewed and updated in EPIC.    ROS:   CONSTITUTIONAL: NEGATIVE for fever, chills, change in weight  INTEGUMENTARU/SKIN: NEGATIVE for worrisome rashes, moles or lesions  EYES: NEGATIVE for vision changes or irritation  ENT: NEGATIVE for ear, mouth and throat problems  RESP: NEGATIVE for significant cough or SOB  BREAST: NEGATIVE for masses, tenderness or discharge  CV: NEGATIVE for chest pain, palpitations or peripheral edema  GI: NEGATIVE for nausea, abdominal pain, heartburn, or change in bowel habits  : NEGATIVE for unusual urinary or vaginal symptoms. Periods are irregular d/t IUD  MUSCULOSKELETAL: NEGATIVE for significant arthralgias or myalgia, positive for sternal pain  NEURO: NEGATIVE for weakness, dizziness or paresthesias  PSYCHIATRIC: NEGATIVE for changes in mood or affect    PHYSICAL EXAM:  /72 (BP Location: Left arm, Cuff Size: Adult Regular)   Wt 55.8 kg (123 lb)   BMI 21.62 kg/m     BMI: Body mass index is 21.62 kg/m .  Constitutional: healthy, alert and no distress  Neck: symmetrical, thyroid normal size, no masses present, no lymphadenopathy present.   Cardiovascular: RRR, no murmurs present  Respiratory: breathing unlabored, lungs CTA bilaterally  Musculoskeletal: sternal pain not reproducible on exam  Breast:normal without masses, tenderness or nipple  discharge and no palpable axillary masses or adenopathy  Gastrointestinal: abdomen soft, non-tender, bowel sounds present  PELVIC EXAM:  Vulva: No lesions, no adenopathy, BUS WNL  Vagina: Moist, pink, discharge normal  well rugated, no lesions  Cervix:smooth, pink, no visible lesions. IUD strings visualized at cervical os  Rectal exam: deferred    ASSESSMENT/PLAN:  1. (Z01.419) Encounter for gynecological examination without abnormal finding  (primary encounter diagnosis)      2. (R87.610) Atypical squamous cells of undetermined significance (ASCUS) on Papanicolaou smear of cervix  Plan: Pap imaged thin layer diagnostic only      3. (R07.89) Sternum pain  - Suspect musculoskeletal in nature given it seems to get better with position changes. Advised her to follow up with PCP for further evaluation. She declines a referral but states she will make an appointment on her own. If chest /sternum pain worsens or she develops associated symptoms such as shortness of breath, she was advised to present to ED.           COUNSELING:   Reviewed preventive health counseling, as reflected in patient instructions   reports that she has never smoked. She has never used smokeless tobacco.      Katie Casillas, APRN, CNM

## 2021-05-17 NOTE — NURSING NOTE
"Chief Complaint   Patient presents with     Physical     pap due       Initial /72 (BP Location: Left arm, Cuff Size: Adult Regular)   Wt 55.8 kg (123 lb)   BMI 21.62 kg/m   Estimated body mass index is 21.62 kg/m  as calculated from the following:    Height as of 18: 1.607 m (5' 3.25\").    Weight as of this encounter: 55.8 kg (123 lb).  BP completed using cuff size: regular    Questioned patient about current smoking habits.  Pt. has never smoked.          The following HM Due: NONE   Pt states she received tdap immunization at [facility]  ~1 week ago.    Alyx Pizano CMA    "

## 2021-05-18 ENCOUNTER — OFFICE VISIT (OUTPATIENT)
Dept: DERMATOLOGY | Facility: CLINIC | Age: 21
End: 2021-05-18
Payer: COMMERCIAL

## 2021-05-18 VITALS — DIASTOLIC BLOOD PRESSURE: 68 MMHG | OXYGEN SATURATION: 100 % | SYSTOLIC BLOOD PRESSURE: 103 MMHG | HEART RATE: 66 BPM

## 2021-05-18 DIAGNOSIS — L91.0 KELOID CICATRIX: Primary | ICD-10-CM

## 2021-05-18 PROCEDURE — 11900 INJECT SKIN LESIONS </W 7: CPT | Performed by: PHYSICIAN ASSISTANT

## 2021-05-18 PROCEDURE — 99207 PR DROP WITH A PROCEDURE: CPT | Performed by: PHYSICIAN ASSISTANT

## 2021-05-18 RX ORDER — TRIAMCINOLONE ACETONIDE 40 MG/ML
40 INJECTION, SUSPENSION INTRA-ARTICULAR; INTRAMUSCULAR ONCE
Status: ACTIVE | OUTPATIENT
Start: 2021-05-18

## 2021-05-18 NOTE — PROGRESS NOTES
HPI:   Chief complaints: Anushka Lewis is a 21 year old female who presents for recheck of the left earlobe post keloid removal. She reports that the area has been healing well.         PHYSICAL EXAM:    /68   Pulse 66   SpO2 100%   Breastfeeding No   Skin exam performed as follows: Type 2 skin. Mood appropriate  Alert and Oriented X 3. Well developed, well nourished in no distress.  General appearance: Normal  Head including face: Normal  Eyes: conjunctiva and lids: Normal  Mouth: Lips, teeth, gums: Normal  Neck: Normal  Chest-breast/axillae: Normal  Back: Normal  Spleen and liver: Normal  Cardiovascular: Exam of peripheral vascular system by observation for swelling, varicosities, edema: Normal  Genitalia: groin, buttocks: Normal  Extremities: digits/nails (clubbing): Normal  Eccrine and Apocrine glands: Normal  Right upper extremity: Normal  Left upper extremity: Normal  Right lower extremity: Normal  Left lower extremity: Normal  Skin: Scalp and body hair: See below    1. Left earlobe with palpable scar tissue      ASSESSMENT/PLAN:     1. 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 There is palpable scar tissue still; will inject again.   --Kenalog 40 mg/cc injected to left earlobe, right earlobe. Total of 0.5 cc's used.  Advised on risk of atrophy and failure to respond. Advised on aftercare.         Follow-up:PRN  CC:   Scribed By: Deidre Augustin MS, PAMichelleC

## 2021-05-18 NOTE — LETTER
5/18/2021         RE: Anushka Lewis  [address] [facility] [address]        Dear Colleague,    Thank you for referring your patient, Anushka Lewis, to the [facility]. Please see a copy of my visit note below.    HPI:   Chief complaints: Anushka Lewis is a 21 year old female who presents for recheck of the left earlobe post keloid removal. She reports that the area has been healing well.         PHYSICAL EXAM:    /68   Pulse 66   SpO2 100%   Breastfeeding No   Skin exam performed as follows: Type 2 skin. Mood appropriate  Alert and Oriented X 3. Well developed, well nourished in no distress.  General appearance: Normal  Head including face: Normal  Eyes: conjunctiva and lids: Normal  Mouth: Lips, teeth, gums: Normal  Neck: Normal  Chest-breast/axillae: Normal  Back: Normal  Spleen and liver: Normal  Cardiovascular: Exam of peripheral vascular system by observation for swelling, varicosities, edema: Normal  Genitalia: groin, buttocks: Normal  Extremities: digits/nails (clubbing): Normal  Eccrine and Apocrine glands: Normal  Right upper extremity: Normal  Left upper extremity: Normal  Right lower extremity: Normal  Left lower extremity: Normal  Skin: Scalp and body hair: See below    1. Left earlobe with palpable scar tissue      ASSESSMENT/PLAN:     1. 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 There is palpable scar tissue still; will inject again.   --Kenalog 40 mg/cc injected to left earlobe, right earlobe. Total of 0.5 cc's used.  Advised on risk of atrophy and failure to respond. Advised on aftercare.         Follow-up:PRN  CC:   Scribed By: Deidre Augustin, MS, PADARIO          Again, thank you for allowing me to participate in the care of your patient.        Sincerely,        Deidre Augustin PA-C

## 2021-05-19 LAB
COPATH REPORT: NORMAL
PAP: NORMAL

## 2021-05-25 ENCOUNTER — PATIENT OUTREACH (OUTPATIENT)
Dept: OBGYN | Facility: CLINIC | Age: 21
End: 2021-05-25
Payer: COMMERCIAL

## 2021-05-25 DIAGNOSIS — R87.610 ATYPICAL SQUAMOUS CELLS OF UNDETERMINED SIGNIFICANCE (ASCUS) ON PAPANICOLAOU SMEAR OF CERVIX: ICD-10-CM

## 2021-05-28 ENCOUNTER — OFFICE VISIT (OUTPATIENT)
Dept: URGENT CARE | Facility: URGENT CARE | Age: 21
End: 2021-05-28
Payer: COMMERCIAL

## 2021-05-28 VITALS
HEART RATE: 80 BPM | TEMPERATURE: 99 F | SYSTOLIC BLOOD PRESSURE: 104 MMHG | WEIGHT: 123 LBS | BODY MASS INDEX: 21.62 KG/M2 | OXYGEN SATURATION: 99 % | DIASTOLIC BLOOD PRESSURE: 66 MMHG

## 2021-05-28 DIAGNOSIS — J01.10 ACUTE FRONTAL SINUSITIS, RECURRENCE NOT SPECIFIED: Primary | ICD-10-CM

## 2021-05-28 PROCEDURE — 99213 OFFICE O/P EST LOW 20 MIN: CPT | Performed by: FAMILY MEDICINE

## 2021-05-28 NOTE — PROGRESS NOTES
SUBJECTIVE: Anushka Lewis is a 21 year old female presenting with a chief complaint of facial pain/pressure.  Onset of symptoms was day(s) ago.  Course of illness is worsening.    Treatment measures tried include just started nasal spray.  Predisposing factors include recent travel and noted symptoms after decesent.    Past Medical History:   Diagnosis Date     Abnormal Pap smear of cervix 2020    see problem list     Allergies   Allergen Reactions     No Known Allergies      Social History     Tobacco Use     Smoking status: Never Smoker     Smokeless tobacco: Never Used   Substance Use Topics     Alcohol use: Yes       ROS:  SKIN: no rash  GI: no vomiting    OBJECTIVE:  /66   Pulse 80   Temp 99  F (37.2  C) (Tympanic)   Wt 55.8 kg (123 lb)   LMP  (LMP Unknown)   SpO2 99%   BMI 21.62 kg/m  GENERAL APPEARANCE: healthy, alert and no distress  EYES: EOMI,  PERRL, conjunctiva clear  HENT: TM's normal bilaterally, oral mucous membranes moist, no erythema noted and frontal sinus tenderness   SKIN: no suspicious lesions or rashes      ICD-10-CM    1. Acute frontal sinusitis, recurrence not specified  J01.10 amoxicillin-clavulanate (AUGMENTIN) 875-125 MG tablet     Cont flonase  Fluids/Rest, f/u if worse/not any better

## 2021-09-18 ENCOUNTER — HEALTH MAINTENANCE LETTER (OUTPATIENT)
Age: 21
End: 2021-09-18

## 2021-10-06 ENCOUNTER — TELEPHONE (OUTPATIENT)
Dept: DERMATOLOGY | Facility: CLINIC | Age: 21
End: 2021-10-06

## 2021-10-06 NOTE — TELEPHONE ENCOUNTER
[facility] Call Center    Phone Message    May a detailed message be left on voicemail: yes     Reason for Call: Appointment Intake    Referring Provider Name:   Pt of Deidre Augustin    Diagnosis and/or Symptoms:   steroid injection for keloid on ear    Action Taken: Other: [facility]    Travel Screening: Not Applicable     Protocols show to send TE for cosmetic and gen derm injection scheduling.    Please call Pt to schedule for injection for keloid of ear.    Thanks!

## 2021-10-06 NOTE — TELEPHONE ENCOUNTER
Called and spoke to patient.    Scheduled next available appointment for left ear keloid injection.    Patient voiced understanding.    Funmilayo RN-BSN-PHN  [facility]  [phone]

## 2021-10-13 ENCOUNTER — TELEPHONE (OUTPATIENT)
Dept: DERMATOLOGY | Facility: CLINIC | Age: 21
End: 2021-10-13

## 2021-10-13 NOTE — TELEPHONE ENCOUNTER
[facility] Call Center    Phone Message    May a detailed message be left on voicemail: yes     Reason for Call: Other: Pt called and cancelled her appointment for 10/21/21 as she received a voicemail stating the provider will not be in clinic that day. Per Protocol I can not schedule for an Injection. Please call Pt back at [phone] to further discuss. Thank you.     Action Taken: Message routed to:  Other: [facility]    Travel Screening: Not Applicable

## 2021-10-13 NOTE — TELEPHONE ENCOUNTER
Please schedule next available gen derm appointment.    Funmilayo RN-BSN-PHN  [facility]  [phone]

## 2021-10-21 ENCOUNTER — OFFICE VISIT (OUTPATIENT)
Dept: DERMATOLOGY | Facility: CLINIC | Age: 21
End: 2021-10-21
Payer: COMMERCIAL

## 2021-10-21 VITALS — SYSTOLIC BLOOD PRESSURE: 103 MMHG | HEART RATE: 78 BPM | OXYGEN SATURATION: 100 % | DIASTOLIC BLOOD PRESSURE: 70 MMHG

## 2021-10-21 DIAGNOSIS — L91.0 KELOID: Primary | ICD-10-CM

## 2021-10-21 PROCEDURE — 11900 INJECT SKIN LESIONS </W 7: CPT | Performed by: DERMATOLOGY

## 2021-10-21 PROCEDURE — 99207 PR NO CHARGE LOS: CPT | Performed by: DERMATOLOGY

## 2021-10-21 NOTE — NURSING NOTE
"Initial /70   Pulse 78   SpO2 100%  Estimated body mass index is 21.62 kg/m  as calculated from the following:    Height as of 5/31/18: 1.607 m (5' 3.25\").    Weight as of 5/28/21: 55.8 kg (123 lb).     SRI Mello-BSN-N  [facility]  [phone]  "

## 2021-10-21 NOTE — LETTER
10/21/2021         RE: Anushka Lewis  [address] [facility] [address]        Dear Colleague,    Thank you for referring your patient, Anushka Lewis, to the [facility]. Please see a copy of my visit note below.    Anushka Lewis is an extremely pleasant 21 year old year old female patient here today for il tac for keloid. Patient has no other skin complaints today.  Remainder of the HPI, Meds, PMH, Allergies, FH, and SH was reviewed in chart.      Past Medical History:   Diagnosis Date     Abnormal Pap smear of cervix 2020    see problem list       Past Surgical History:   Procedure Laterality Date     NO HISTORY OF SURGERY          Family History   Problem Relation Age of Onset     Family History Negative Mother      Depression Father      Diabetes Father         type 2 dx age 44       Social History     Socioeconomic History     Marital status: Single     Spouse name: Not on file     Number of children: Not on file     Years of education: Not on file     Highest education level: Not on file   Occupational History     Not on file   Tobacco Use     Smoking status: Never Smoker     Smokeless tobacco: Never Used   Substance and Sexual Activity     Alcohol use: Yes     Drug use: No     Sexual activity: Yes     Birth control/protection: Condom, Pill     Comment: carola may 2018   Other Topics Concern     Parent/sibling w/ CABG, MI or angioplasty before 65F 55M? Not Asked   Social History Narrative     Not on file     Social Determinants of Health     Financial Resource Strain:      Difficulty of Paying Living Expenses:    Food Insecurity:      Worried About Running Out of Food in the Last Year:      Ran Out of Food in the Last Year:    Transportation Needs:      Lack of Transportation (Medical):      Lack of Transportation (Non-Medical):    Physical Activity:      Days of Exercise per Week:      Minutes of Exercise per Session:    Stress:      Feeling of Stress :     Social Connections:      Frequency of Communication with Friends and Family:      Frequency of Social Gatherings with Friends and Family:      Attends [employer]ist Services:      Active Member of Clubs or Organizations:      Attends Club or Organization Meetings:      Marital Status:    Intimate Partner Violence:      Fear of Current or Ex-Partner:      Emotionally Abused:      Physically Abused:      Sexually Abused:        Outpatient Encounter Medications as of 10/21/2021   Medication Sig Dispense Refill     Acetaminophen (TYLENOL PO) Take  by mouth.       levonorgestrel (KYLEENA) 19.5 MG IUD 1 each (19.5 mg) by Intrauterine route once       levonorgestrel (KYLEENA) 19.5 MG IUD 1 each by Intrauterine route once Removal: 5/29/2025       Facility-Administered Encounter Medications as of 10/21/2021   Medication Dose Route Frequency Provider Last Rate Last Admin     triamcinolone (KENALOG-40) injection 40 mg  40 mg Intra-Lesional Once Deidre Augustin PA-LIBBY         triamcinolone (KENALOG-40) injection 40 mg  40 mg Intra-Lesional Once Deidre Augustin PA-C         triamcinolone (KENALOG-40) injection 40 mg  40 mg Intra-Lesional Once DemetriaDeidre chandler PA-C         triamcinolone acetonide (KENALOG-10) injection 10 mg  10 mg Intra-Lesional Once Deidre Augustin PA-C                 O:   NAD, WDWN, Alert & Oriented, Mood & Affect wnl, Vitals stable   Here today alone   /70   Pulse 78   SpO2 100%    General appearance normal   Vitals stable   Alert, oriented and in no acute distress     L helix firm nodule      Eyes: Conjunctivae/lids:Normal     ENT: Lips, buccal mucosa, tongue: normal    MSK:Normal    Cardiovascular: peripheral edema none    Pulm: Breathing Normal    Neuro/Psych: Orientation:Alert and Orientedx3 ; Mood/Affect:normal       A/P:  1. Keloid  IL TAC: PGACAC discussed.  Risks including but not limited to injection site reaction, bruising, no resolution.  All questions answered and entertained to  patient s satisfaction.  Informed consent obtained.  IL TAC in concentration of 40mg/ml was injected ID to L helix.  Total injected was  0.1 ml.  Patient tolerated without complications and given wound care instructions, including not to move product around.  Return in 4 weeks for follow-up and possible additional IL TAC.      [id number]  11/2022  It was a pleasure speaking to Anushka Lewis today.  Return to clinic 6 weeks        Again, thank you for allowing me to participate in the care of your patient.        Sincerely,        Robert Dumont MD

## 2021-10-21 NOTE — PROGRESS NOTES
Anushka Lewis is an extremely pleasant 21 year old year old female patient here today for il tac for keloid. Patient has no other skin complaints today.  Remainder of the HPI, Meds, PMH, Allergies, FH, and SH was reviewed in chart.      Past Medical History:   Diagnosis Date     Abnormal Pap smear of cervix 2020    see problem list       Past Surgical History:   Procedure Laterality Date     NO HISTORY OF SURGERY          Family History   Problem Relation Age of Onset     Family History Negative Mother      Depression Father      Diabetes Father         type 2 dx age 44       Social History     Socioeconomic History     Marital status: Single     Spouse name: Not on file     Number of children: Not on file     Years of education: Not on file     Highest education level: Not on file   Occupational History     Not on file   Tobacco Use     Smoking status: Never Smoker     Smokeless tobacco: Never Used   Substance and Sexual Activity     Alcohol use: Yes     Drug use: No     Sexual activity: Yes     Birth control/protection: Condom, Pill     Comment: carola may 2018   Other Topics Concern     Parent/sibling w/ CABG, MI or angioplasty before 65F 55M? Not Asked   Social History Narrative     Not on file     Social Determinants of Health     Financial Resource Strain:      Difficulty of Paying Living Expenses:    Food Insecurity:      Worried About Running Out of Food in the Last Year:      Ran Out of Food in the Last Year:    Transportation Needs:      Lack of Transportation (Medical):      Lack of Transportation (Non-Medical):    Physical Activity:      Days of Exercise per Week:      Minutes of Exercise per Session:    Stress:      Feeling of Stress :    Social Connections:      Frequency of Communication with Friends and Family:      Frequency of Social Gatherings with Friends and Family:      Attends Rastafarian Services:      Active Member of Clubs or Organizations:      Attends Club or Organization Meetings:       Marital Status:    Intimate Partner Violence:      Fear of Current or Ex-Partner:      Emotionally Abused:      Physically Abused:      Sexually Abused:        Outpatient Encounter Medications as of 10/21/2021   Medication Sig Dispense Refill     Acetaminophen (TYLENOL PO) Take  by mouth.       levonorgestrel (KYLEENA) 19.5 MG IUD 1 each (19.5 mg) by Intrauterine route once       levonorgestrel (KYLEENA) 19.5 MG IUD 1 each by Intrauterine route once Removal: 5/29/2025       Facility-Administered Encounter Medications as of 10/21/2021   Medication Dose Route Frequency Provider Last Rate Last Admin     triamcinolone (KENALOG-40) injection 40 mg  40 mg Intra-Lesional Once DemetriaTerryn T, PA-C         triamcinolone (KENALOG-40) injection 40 mg  40 mg Intra-Lesional Once Demetria, Deidre T, PA-C         triamcinolone (KENALOG-40) injection 40 mg  40 mg Intra-Lesional Once DemetriaTerryn T, PA-C         triamcinolone acetonide (KENALOG-10) injection 10 mg  10 mg Intra-Lesional Once Demetria, Deidre T, PA-C                 O:   NAD, WDWN, Alert & Oriented, Mood & Affect wnl, Vitals stable   Here today alone   /70   Pulse 78   SpO2 100%    General appearance normal   Vitals stable   Alert, oriented and in no acute distress     L helix firm nodule      Eyes: Conjunctivae/lids:Normal     ENT: Lips, buccal mucosa, tongue: normal    MSK:Normal    Cardiovascular: peripheral edema none    Pulm: Breathing Normal    Neuro/Psych: Orientation:Alert and Orientedx3 ; Mood/Affect:normal       A/P:  1. Keloid  IL TAC: PGACAC discussed.  Risks including but not limited to injection site reaction, bruising, no resolution.  All questions answered and entertained to patient s satisfaction.  Informed consent obtained.  IL TAC in concentration of 40mg/ml was injected ID to L helix.  Total injected was  0.1 ml.  Patient tolerated without complications and given wound care instructions, including not to move product around.  Return in  4 weeks for follow-up and possible additional IL TAC.      [id number]  11/2022  It was a pleasure speaking to Anushka Lewis today.  Return to clinic 6 weeks

## 2021-11-10 ENCOUNTER — TELEPHONE (OUTPATIENT)
Dept: GASTROENTEROLOGY | Facility: CLINIC | Age: 21
End: 2021-11-10
Payer: COMMERCIAL

## 2021-11-10 NOTE — TELEPHONE ENCOUNTER
Left a voicemail to reschedule February 8, 2022 10:00 am appointment with Dr. Perez due to provider scheduling conflict

## 2022-02-22 ENCOUNTER — OFFICE VISIT (OUTPATIENT)
Dept: DERMATOLOGY | Facility: CLINIC | Age: 22
End: 2022-02-22
Payer: COMMERCIAL

## 2022-02-22 VITALS — DIASTOLIC BLOOD PRESSURE: 72 MMHG | SYSTOLIC BLOOD PRESSURE: 113 MMHG | HEART RATE: 65 BPM | OXYGEN SATURATION: 100 %

## 2022-02-22 DIAGNOSIS — L91.0 KELOID CICATRIX: Primary | ICD-10-CM

## 2022-02-22 DIAGNOSIS — D23.9 DERMAL NEVUS: ICD-10-CM

## 2022-02-22 PROCEDURE — 99212 OFFICE O/P EST SF 10 MIN: CPT | Mod: 25 | Performed by: PHYSICIAN ASSISTANT

## 2022-02-22 PROCEDURE — 11900 INJECT SKIN LESIONS </W 7: CPT | Performed by: PHYSICIAN ASSISTANT

## 2022-02-22 NOTE — PROGRESS NOTES
HPI:   Chief complaints: Anushka Lewis is a 21 year old female who presents for recheck of the left earlobe keloid. She reports she still feels a bump in the lobe but overall it has been doing well.     Additional concern: mole on the right side of her abdomen. It has been present for awhile but she would like to make sure it's ok.     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 Mood appropriate  Alert and Oriented X 3. Well developed, well nourished in no distress.  General appearance: Normal  Head including face: Normal  Eyes: conjunctiva and lids: Normal  Mouth: Lips, teeth, gums: Normal  Neck: Normal  Chest-breast/axillae: Normal  Back: Normal  Spleen and liver: Normal  Cardiovascular: Exam of peripheral vascular system by observation for swelling, varicosities, edema: Normal  Genitalia: groin, buttocks: Normal  Extremities: digits/nails (clubbing): Normal  Eccrine and Apocrine glands: Normal  Right upper extremity: Normal  Left upper extremity: Normal  Right lower extremity: Normal  Left lower extremity: Normal  Skin: Scalp and body hair: See below    1. Left earlobe with palpable firm tissue  2. 4 mm brown fleshy papule on the right abdomen      ASSESSMENT/PLAN:     1. 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 There is palpable scar tissue still; will inject again.   --Kenalog 40 mg/cc injected to left earlobe, right earlobe. Total of 0.1 cc's used.  Advised on risk of atrophy and failure to respond. Advised on aftercare.     2. Dermal nevus on the right abdomen - advised benign no treatment needed      Lot: [id number]  Exp: 04/2023    Follow-up: 4-6 months if needed  CC:   Scribed By: Deidre Augustin, MS, PA-C

## 2022-02-22 NOTE — LETTER
2/22/2022         RE: Anushka Lewis  [address] [facility] [address]        Dear Colleague,    Thank you for referring your patient, Anushka Lewis, to the [facility]. Please see a copy of my visit note below.    HPI:   Chief complaints: Anushka Lewis is a 21 year old female who presents for recheck of the left earlobe keloid. She reports she still feels a bump in the lobe but overall it has been doing well.     Additional concern: mole on the right side of her abdomen. It has been present for awhile but she would like to make sure it's ok.     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 Mood appropriate  Alert and Oriented X 3. Well developed, well nourished in no distress.  General appearance: Normal  Head including face: Normal  Eyes: conjunctiva and lids: Normal  Mouth: Lips, teeth, gums: Normal  Neck: Normal  Chest-breast/axillae: Normal  Back: Normal  Spleen and liver: Normal  Cardiovascular: Exam of peripheral vascular system by observation for swelling, varicosities, edema: Normal  Genitalia: groin, buttocks: Normal  Extremities: digits/nails (clubbing): Normal  Eccrine and Apocrine glands: Normal  Right upper extremity: Normal  Left upper extremity: Normal  Right lower extremity: Normal  Left lower extremity: Normal  Skin: Scalp and body hair: See below    1. Left earlobe with palpable firm tissue  2. 4 mm brown fleshy papule on the right abdomen      ASSESSMENT/PLAN:     1. 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 There is palpable scar tissue still; will inject again.   --Kenalog 40 mg/cc injected to left earlobe, right earlobe. Total of 0.1 cc's used.  Advised on risk of atrophy and failure to respond. Advised on aftercare.     2. Dermal nevus on the right abdomen - advised benign no treatment needed      Lot: [id number]  Exp:  04/2023    Follow-up: 4-6 months if needed  CC:   Scribed By: Deidre Augustin, MS, PAMichelleC          Again, thank you for allowing me to participate in the care of your patient.        Sincerely,        Deidre Augustin PA-C

## 2022-06-25 ENCOUNTER — HEALTH MAINTENANCE LETTER (OUTPATIENT)
Age: 22
End: 2022-06-25

## 2022-09-23 ENCOUNTER — OFFICE VISIT (OUTPATIENT)
Dept: DERMATOLOGY | Facility: CLINIC | Age: 22
End: 2022-09-23
Payer: COMMERCIAL

## 2022-09-23 DIAGNOSIS — D23.9 DERMAL NEVUS: ICD-10-CM

## 2022-09-23 DIAGNOSIS — L91.0 KELOID CICATRIX: Primary | ICD-10-CM

## 2022-09-23 PROCEDURE — 99212 OFFICE O/P EST SF 10 MIN: CPT | Mod: 25 | Performed by: PHYSICIAN ASSISTANT

## 2022-09-23 PROCEDURE — 11900 INJECT SKIN LESIONS </W 7: CPT | Performed by: PHYSICIAN ASSISTANT

## 2022-09-23 RX ORDER — TRIAMCINOLONE ACETONIDE 40 MG/ML
40 INJECTION, SUSPENSION INTRA-ARTICULAR; INTRAMUSCULAR ONCE
Status: COMPLETED | OUTPATIENT
Start: 2022-09-23 | End: 2022-09-23

## 2022-09-23 RX ADMIN — TRIAMCINOLONE ACETONIDE 40 MG: 40 INJECTION, SUSPENSION INTRA-ARTICULAR; INTRAMUSCULAR at 13:33

## 2022-09-23 NOTE — PROGRESS NOTES
HPI:   Chief complaints: Anushka Lewis is a 22 year old female who presents for recheck of the left earlobe keloid. She reports she still feels a bump in the lobe but overall it has been doing well.     She would also like to recheck the mole on her right abdomen.     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  Skin exam performed as follows: Type 2 skin. Mood appropriate  Alert and Oriented X 3. Well developed, well nourished in no distress.  General appearance: Normal  Head including face: Normal  Eyes: conjunctiva and lids: Normal  Mouth: Lips, teeth, gums: Normal  Neck: Normal  Chest-breast/axillae: Normal  Back: Normal  Spleen and liver: Normal  Cardiovascular: Exam of peripheral vascular system by observation for swelling, varicosities, edema: Normal  Genitalia: groin, buttocks: Normal  Extremities: digits/nails (clubbing): Normal  Eccrine and Apocrine glands: Normal  Right upper extremity: Normal  Left upper extremity: Normal  Right lower extremity: Normal  Left lower extremity: Normal  Skin: Scalp and body hair: See below    1. Left earlobe with palpable firm tissue  2. 4 mm brown fleshy papule on the right abdomen      ASSESSMENT/PLAN:     1. 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 There is palpable scar tissue still; will inject again.   --Kenalog 40 mg/cc injected to left earlobe, right earlobe. Total of 0.1 cc's used.  Advised on risk of atrophy and failure to respond. Advised on aftercare.     2. Dermal nevus on the right abdomen - benign in appearance. Discussed removal vs monitoring and she prefers to monitor.         Follow-up: 4-6 months if needed  CC:   Scribed By: Deidre Augustin, MS, PA-C

## 2022-09-23 NOTE — LETTER
9/23/2022         RE: Anushka Lewis  [address]  [facility] [address]        Dear Colleague,    Thank you for referring your patient, Anushka Lewis, to the [facility]. Please see a copy of my visit note below.    HPI:   Chief complaints: Anushka Lewis is a 22 year old female who presents for recheck of the left earlobe keloid. She reports she still feels a bump in the lobe but overall it has been doing well.     She would also like to recheck the mole on her right abdomen.     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  Skin exam performed as follows: Type 2 skin. Mood appropriate  Alert and Oriented X 3. Well developed, well nourished in no distress.  General appearance: Normal  Head including face: Normal  Eyes: conjunctiva and lids: Normal  Mouth: Lips, teeth, gums: Normal  Neck: Normal  Chest-breast/axillae: Normal  Back: Normal  Spleen and liver: Normal  Cardiovascular: Exam of peripheral vascular system by observation for swelling, varicosities, edema: Normal  Genitalia: groin, buttocks: Normal  Extremities: digits/nails (clubbing): Normal  Eccrine and Apocrine glands: Normal  Right upper extremity: Normal  Left upper extremity: Normal  Right lower extremity: Normal  Left lower extremity: Normal  Skin: Scalp and body hair: See below    1. Left earlobe with palpable firm tissue  2. 4 mm brown fleshy papule on the right abdomen      ASSESSMENT/PLAN:     1. 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 There is palpable scar tissue still; will inject again.   --Kenalog 40 mg/cc injected to left earlobe, right earlobe. Total of 0.1 cc's used.  Advised on risk of atrophy and failure to respond. Advised on aftercare.     2. Dermal nevus on the right abdomen - benign in appearance. Discussed removal vs monitoring and she prefers to monitor.         Follow-up: 4-6  months if needed  CC:   Scribed By: Deidre Augustin, MS, PAMichelleC          Again, thank you for allowing me to participate in the care of your patient.        Sincerely,        Deidre Augustin PA-C

## 2022-11-20 ENCOUNTER — HEALTH MAINTENANCE LETTER (OUTPATIENT)
Age: 22
End: 2022-11-20

## 2023-01-09 PROBLEM — R87.610 ATYPICAL SQUAMOUS CELLS OF UNDETERMINED SIGNIFICANCE (ASCUS) ON PAPANICOLAOU SMEAR OF CERVIX: Status: ACTIVE | Noted: 2020-05-29

## 2023-04-07 NOTE — PROGRESS NOTES
".Anushka is a 23 year old  female who presents for annual exam.     Besides routine health maintenance,  she would like to discuss bleeding.  HPI:  Has had her kyleena IUD for abut 3 years and recently she has started to notice bleeding after intercourse.  Does not seem to be positional.  No pain associated with it and no other symptoms aside from \"once or twice per month\" she gets some pressure/discomfort on her right side. \"Feels like the iud is pushing into the uterus\".   Menses are regular q 25 days and normal lasting 3 days.   Menses flow: light.    Patient's last menstrual period was 04/10/2023..   Using IUD for contraception.    She is not currently considering pregnancy.    REPRODUCTIVE/GYNECOLOGIC HISTORY:  Anushka is sexually active with male partner(s) and is currently in monogamous relationship.   STI testing offered?  Accepted  History of abnormal Pap smear:  Yes  SOCIAL HISTORY  Abuse: does not report having previously been physical or sexually abused.    Do you feel safe in your environment? YES      She  reports that she has never smoked. She has never used smokeless tobacco.      Last PHQ-9 score on record =       2018     8:12 AM   PHQ-9 SCORE   PHQ-9 Total Score 0     Last GAD7 score on record =        View : No data to display.              Alcohol Score = 1    HEALTH MAINTENANCE:  Care Gaps  Close care gaps     Overdue          Never   Done ADVANCE CARE PLANNING (Every 5 Years)       Never   Done HIV SCREENING (Once)       Never   Done HEPATITIS C SCREENING (Once)       APR 15   2021 COVID-19 Vaccine (3 - Booster for Pfizer series)  Last completed: 2021     MAY 29   2021 CHLAMYDIA SCREENING (Yearly)  Last completed: May 29, 2020     ARTEM 1   2023 PHQ-2 (once per calendar year) (Yearly, January to December)  Last completed: May 17, 2021        Upcoming          2023 YEARLY PREVENTIVE VISIT (Yearly)   Last completed:  PAP (Every 3 Years)   " Last completed: 2022     MAY 11   2031 DTAP/TDAP/TD IMMUNIZATION (8 - Td or Tdap)   Last completed: May 11, 2021                 HEALTHY HABITS  Eating habits: eats regular meals, follows a balanced nutrition diet, has adequate calcium intake and takes daily vitamins  Calcium/Vitamin D intake: source:  dairy, and non-dairy, Adequate yes.  Exercise: How often do you exercise? 5-7 times/week;Walking, Cardio and Strength Training.  Have you had an eye exam in the last two years? YES     Do you routinely see the dentist once or twice yearly? YES        HISTORY:  OB History    Para Term  AB Living   0 0 0 0 0 0   SAB IAB Ectopic Multiple Live Births   0 0 0 0 0     Past Medical History:   Diagnosis Date     Abnormal Pap smear of cervix     see problem list     Past Surgical History:   Procedure Laterality Date     NO HISTORY OF SURGERY       Family History   Problem Relation Age of Onset     Family History Negative Mother      Depression Father      Diabetes Father         type 2 dx age 44     Social History     Socioeconomic History     Marital status: Single   Tobacco Use     Smoking status: Never     Smokeless tobacco: Never   Substance and Sexual Activity     Alcohol use: Yes     Drug use: No     Sexual activity: Yes     Birth control/protection: Condom, Pill     Comment: carola may 2018       Current Outpatient Medications:      Acetaminophen (TYLENOL PO), Take  by mouth., Disp: , Rfl:      levonorgestrel (KYLEENA) 19.5 MG IUD, 1 each (19.5 mg) by Intrauterine route once, Disp:  , Rfl:      levonorgestrel (KYLEENA) 19.5 MG IUD, 1 each by Intrauterine route once Removal: 2025, Disp:  , Rfl:     Current Facility-Administered Medications:      triamcinolone (KENALOG-40) injection 40 mg, 40 mg, Intra-Lesional, Once, Deidre Augustin PA-C     triamcinolone (KENALOG-40) injection 40 mg, 40 mg, Intra-Lesional, Once, Deidre Augustin PA-C     triamcinolone (KENALOG-40) injection 40 mg, 40  "mg, Intra-Lesional, Once, Deidre Augustin PA-C     triamcinolone acetonide (KENALOG-10) injection 10 mg, 10 mg, Intra-Lesional, Once, Deidre Augustin PA-C     Allergies   Allergen Reactions     No Known Allergies        Past medical, surgical, social and family history were reviewed and updated in EPIC.    ROS:   12 point review of systems negative other than symptoms noted below or in the HPI.    PHYSICAL EXAM:  /66   Pulse 68   Ht 1.6 m (5' 3\")   Wt 57.2 kg (126 lb)   LMP 04/10/2023   BMI 22.32 kg/m     BMI: Body mass index is 22.32 kg/m .  Constitutional: healthy, alert and no distress  Neck: symmetrical, thyroid normal size, no masses present, no lymphadenopathy present.   Cardiovascular: RRR, no murmurs present  Respiratory: breathing unlabored, lungs CTA bilaterally  Breast:normal without masses, tenderness or nipple discharge and no palpable axillary masses or adenopathy  Gastrointestinal: abdomen soft, non-tender, bowel sounds present  PELVIC EXAM:  Uterus: Normal size, non-tender, mobile        ASSESSMENT/PLAN:    ICD-10-CM    1. Routine general medical examination at a health care facility  Z00.00 NEISSERIA GONORRHOEA PCR     CHLAMYDIA TRACHOMATIS PCR     Basic metabolic panel     Hemoglobin A1c     Lipid Profile     TSH     US Pelvic Complete with Transvaginal     Basic metabolic panel     Hemoglobin A1c     Lipid Profile     TSH      2. Encounter for gynecological examination without abnormal finding  Z01.419       3. Vaginal bleeding before 22 weeks gestation  O20.9       4. Dysmenorrhea  N94.6 Bacterial Vaginosis Smear     Yeast culture        Results for orders placed or performed in visit on 04/11/23   Bacterial Vaginosis Smear     Status: None ()    Specimen: Vagina; Swab    Narrative    The following orders were created for panel order Bacterial Vaginosis Smear.  Procedure                               Abnormality         Status                     ---------                         "       -----------         ------                     Bacterial Vaginosis Stain[[id number]]                                                     Please view results for these tests on the individual orders.         COUNSELING:   Reviewed preventive health counseling, as reflected in patient instructions       Regular exercise       Healthy diet/nutrition       Vision screening       Osteoporosis prevention/bone health       Safe sex practices/STD prevention     Will get baseline routine labs.  Collected wet prep and gc.  Discussed causes of intermenstrual spotting including IUD hormone fluctuations, position of cervix during intercourse, vaginal infections, misplacement of IUD.   Will get ultrasound as well to confirm IUD placement.  Follow up with results as they come in.       SARKIS Watkins CNM

## 2023-04-11 ENCOUNTER — OFFICE VISIT (OUTPATIENT)
Dept: MIDWIFE SERVICES | Facility: CLINIC | Age: 23
End: 2023-04-11
Payer: COMMERCIAL

## 2023-04-11 VITALS
HEIGHT: 63 IN | SYSTOLIC BLOOD PRESSURE: 106 MMHG | BODY MASS INDEX: 22.32 KG/M2 | HEART RATE: 68 BPM | WEIGHT: 126 LBS | DIASTOLIC BLOOD PRESSURE: 66 MMHG

## 2023-04-11 DIAGNOSIS — Z01.419 ENCOUNTER FOR GYNECOLOGICAL EXAMINATION WITHOUT ABNORMAL FINDING: ICD-10-CM

## 2023-04-11 DIAGNOSIS — N94.6 DYSMENORRHEA: ICD-10-CM

## 2023-04-11 DIAGNOSIS — Z00.00 ROUTINE GENERAL MEDICAL EXAMINATION AT A HEALTH CARE FACILITY: Primary | ICD-10-CM

## 2023-04-11 LAB
ANION GAP SERPL CALCULATED.3IONS-SCNC: 15 MMOL/L (ref 7–15)
BUN SERPL-MCNC: 14.4 MG/DL (ref 6–20)
CALCIUM SERPL-MCNC: 9.9 MG/DL (ref 8.6–10)
CHLORIDE SERPL-SCNC: 103 MMOL/L (ref 98–107)
CHOLEST SERPL-MCNC: 159 MG/DL
CREAT SERPL-MCNC: 0.84 MG/DL (ref 0.51–0.95)
DEPRECATED HCO3 PLAS-SCNC: 22 MMOL/L (ref 22–29)
GFR SERPL CREATININE-BSD FRML MDRD: >90 ML/MIN/1.73M2
GLUCOSE SERPL-MCNC: 77 MG/DL (ref 70–99)
HBA1C MFR BLD: 4.9 % (ref 0–5.6)
HDLC SERPL-MCNC: 74 MG/DL
LDLC SERPL CALC-MCNC: 75 MG/DL
NONHDLC SERPL-MCNC: 85 MG/DL
NUGENT SCORE: 0
POTASSIUM SERPL-SCNC: 3.9 MMOL/L (ref 3.4–5.3)
SODIUM SERPL-SCNC: 140 MMOL/L (ref 136–145)
TRIGL SERPL-MCNC: 51 MG/DL
TSH SERPL DL<=0.005 MIU/L-ACNC: 1.25 UIU/ML (ref 0.3–4.2)
WHITE BLOOD CELLS: NORMAL

## 2023-04-11 PROCEDURE — 87491 CHLMYD TRACH DNA AMP PROBE: CPT | Performed by: ADVANCED PRACTICE MIDWIFE

## 2023-04-11 PROCEDURE — 80061 LIPID PANEL: CPT | Performed by: ADVANCED PRACTICE MIDWIFE

## 2023-04-11 PROCEDURE — 80048 BASIC METABOLIC PNL TOTAL CA: CPT | Performed by: ADVANCED PRACTICE MIDWIFE

## 2023-04-11 PROCEDURE — 99395 PREV VISIT EST AGE 18-39: CPT | Performed by: ADVANCED PRACTICE MIDWIFE

## 2023-04-11 PROCEDURE — 36415 COLL VENOUS BLD VENIPUNCTURE: CPT | Performed by: ADVANCED PRACTICE MIDWIFE

## 2023-04-11 PROCEDURE — 99213 OFFICE O/P EST LOW 20 MIN: CPT | Mod: 25 | Performed by: ADVANCED PRACTICE MIDWIFE

## 2023-04-11 PROCEDURE — 84443 ASSAY THYROID STIM HORMONE: CPT | Performed by: ADVANCED PRACTICE MIDWIFE

## 2023-04-11 PROCEDURE — 87102 FUNGUS ISOLATION CULTURE: CPT | Performed by: ADVANCED PRACTICE MIDWIFE

## 2023-04-11 PROCEDURE — 87591 N.GONORRHOEAE DNA AMP PROB: CPT | Performed by: ADVANCED PRACTICE MIDWIFE

## 2023-04-11 PROCEDURE — 83036 HEMOGLOBIN GLYCOSYLATED A1C: CPT | Performed by: ADVANCED PRACTICE MIDWIFE

## 2023-04-11 PROCEDURE — 87205 SMEAR GRAM STAIN: CPT | Performed by: ADVANCED PRACTICE MIDWIFE

## 2023-04-11 ASSESSMENT — ANXIETY QUESTIONNAIRES
2. NOT BEING ABLE TO STOP OR CONTROL WORRYING: SEVERAL DAYS
IF YOU CHECKED OFF ANY PROBLEMS ON THIS QUESTIONNAIRE, HOW DIFFICULT HAVE THESE PROBLEMS MADE IT FOR YOU TO DO YOUR WORK, TAKE CARE OF THINGS AT HOME, OR GET ALONG WITH OTHER PEOPLE: SOMEWHAT DIFFICULT
6. BECOMING EASILY ANNOYED OR IRRITABLE: NOT AT ALL
GAD7 TOTAL SCORE: 4
7. FEELING AFRAID AS IF SOMETHING AWFUL MIGHT HAPPEN: NOT AT ALL
5. BEING SO RESTLESS THAT IT IS HARD TO SIT STILL: SEVERAL DAYS
3. WORRYING TOO MUCH ABOUT DIFFERENT THINGS: NOT AT ALL
GAD7 TOTAL SCORE: 4
1. FEELING NERVOUS, ANXIOUS, OR ON EDGE: SEVERAL DAYS

## 2023-04-11 ASSESSMENT — PATIENT HEALTH QUESTIONNAIRE - PHQ9
SUM OF ALL RESPONSES TO PHQ QUESTIONS 1-9: 1
5. POOR APPETITE OR OVEREATING: SEVERAL DAYS

## 2023-04-12 LAB
C TRACH DNA SPEC QL NAA+PROBE: NEGATIVE
N GONORRHOEA DNA SPEC QL NAA+PROBE: NEGATIVE

## 2023-04-15 LAB — BACTERIA SPEC CULT: NO GROWTH

## 2023-04-28 NOTE — PROGRESS NOTES
SUBJECTIVE:                                                   Anushka Lewis is a 23 year old who presents to clinic today for the following health issue(s):  Patient presents with:  Ultrasound      HPI:  Ashley was evaluated on 4/10 for BTB and intermittent RLQ pain with Kyleena IUD in place.  She had an US today for evaluation.     Patient's last menstrual period was 04/10/2023.  Menstrual History: IUD  Patient is sexually active  .  Using none for contraception.   Health maintenance updated:  no  STI infx testing offered:  Declined    Last PHQ-9 score on record =       2023     1:59 PM   PHQ-9 SCORE   PHQ-9 Total Score 1     Last GAD7 score on record =       2023     1:59 PM   LINDA-7 SCORE   Total Score 4     Alcohol Score = 1    Problem list and histories reviewed & adjusted, as indicated.  Additional history: as documented.    Patient Active Problem List   Diagnosis     Dysmenorrhea     Routine general medical examination at a health care facility     Atypical squamous cells of undetermined significance (ASCUS) on Papanicolaou smear of cervix     IUD (intrauterine device) in place     Past Surgical History:   Procedure Laterality Date     NO HISTORY OF SURGERY        Social History     Tobacco Use     Smoking status: Never     Smokeless tobacco: Never   Vaping Use     Vaping status: Not on file   Substance Use Topics     Alcohol use: Yes      Problem (# of Occurrences) Relation (Name,Age of Onset)    Depression (1) Father    Diabetes (1) Father: type 2 dx age 44    Family History Negative (1) Mother            Current Outpatient Medications   Medication Sig     Acetaminophen (TYLENOL PO) Take  by mouth.     levonorgestrel (KYLEENA) 19.5 MG IUD 1 each (19.5 mg) by Intrauterine route once     Current Facility-Administered Medications   Medication     triamcinolone (KENALOG-40) injection 40 mg     triamcinolone (KENALOG-40) injection 40 mg     triamcinolone (KENALOG-40) injection 40 mg      "triamcinolone acetonide (KENALOG-10) injection 10 mg     Allergies   Allergen Reactions     No Known Allergies        ROS:  12 point review of systems negative other than symptoms noted below or in the HPI.  12 point review of systems negative other than symptoms noted below.    OBJECTIVE:     /60   Pulse 68   Ht 1.6 m (5' 3\")   Wt 57.2 kg (126 lb)   LMP 04/10/2023   BMI 22.32 kg/m    Body mass index is 22.32 kg/m .    PHYSICAL EXAM:  Constitutional:  Appearance: Well nourished, well developed alert, in no acute distress  Neurologic:  Mental Status:  Oriented X3.  Normal strength and tone, sensory exam grossly normal, mentation intact and speech normal.    Psychiatric:  Mentation appears normal and affect normal/bright.     In-Clinic Test Results:  Results for orders placed or performed in visit on 05/02/23 (from the past 24 hour(s))   US Transvaginal Pelvic Non-OB    Narrative    Table formatting from the original result was not included.  US Transvaginal Pelvic Non-OB  Order #: [id number] Accession #: [id number]  Study Notes       Spurling, Brittnee on 5/2/2023  1:29 PM      Gynecological Ultrasound Report  Pelvic U/S - Transvaginal  [facility]  Referring Provider: Tiesha Ball CNM  Sonographer:  Brittnee Spurling, RDMS  Indication: IUD check  LMP: Patient's last menstrual period was 04/10/2023.  History: IUD  Gynecological Ultrasonography:   Uterus: anteverted. Contour is smooth/regular.  Size: 7.8 x 3.5 x 2.6 cm  Endometrium: Thickness Total 3.4 mm  Findings: IUD in correct place  Right Ovary: 3.7 x 2.0 x 2.2 cm. Follicle 12.9 mm.  Left Ovary: 2.7 x 1.8 x 1.5 cm. Wnl  Cul de Sac Free Fluid: No free fluid     Impression: Anteverted uterus with an intrauterine device normally located   at the uterine fundus. Normal bilateral ovaries.  Brooklyn Mcneil MD                ASSESSMENT/PLAN:                                                        ICD-10-CM    1. IUD (intrauterine " device) in place  Z97.5           COUNSELING:  -discussed preliminary result WNL, will send fsboWOW message or call with any further recommendations  -discussed Kyleena MOA and SEs, may continue to have intermittent bleeding with IUD in place  -provided reassurance that IUD is in place  -briefly discussed other contraceptive options: changing to Mirena IUD, COCs  -would like to continue with Kyleena at this time  -return to clinic for any further bleeding, pain, or any other concerns.     15 minutes spent by me on the date of the encounter doing chart review, review of test results, patient visit and documentation     Alix DOCKERY, GENE, [facility]  [phone]

## 2023-05-02 ENCOUNTER — ANCILLARY PROCEDURE (OUTPATIENT)
Dept: ULTRASOUND IMAGING | Facility: CLINIC | Age: 23
End: 2023-05-02
Attending: ADVANCED PRACTICE MIDWIFE
Payer: COMMERCIAL

## 2023-05-02 ENCOUNTER — OFFICE VISIT (OUTPATIENT)
Dept: MIDWIFE SERVICES | Facility: CLINIC | Age: 23
End: 2023-05-02
Attending: ADVANCED PRACTICE MIDWIFE
Payer: COMMERCIAL

## 2023-05-02 VITALS
SYSTOLIC BLOOD PRESSURE: 102 MMHG | WEIGHT: 126 LBS | HEART RATE: 68 BPM | DIASTOLIC BLOOD PRESSURE: 60 MMHG | HEIGHT: 63 IN | BODY MASS INDEX: 22.32 KG/M2

## 2023-05-02 DIAGNOSIS — Z00.00 ROUTINE GENERAL MEDICAL EXAMINATION AT A HEALTH CARE FACILITY: ICD-10-CM

## 2023-05-02 DIAGNOSIS — Z97.5 IUD (INTRAUTERINE DEVICE) IN PLACE: Primary | ICD-10-CM

## 2023-05-02 PROCEDURE — 76830 TRANSVAGINAL US NON-OB: CPT | Performed by: OBSTETRICS & GYNECOLOGY

## 2023-05-02 PROCEDURE — 99213 OFFICE O/P EST LOW 20 MIN: CPT | Performed by: NURSE PRACTITIONER

## 2023-05-02 NOTE — PATIENT INSTRUCTIONS
Your Intrauterine Device (IUD)     What to watch for right after IUD placement:    Some women may experience uterine cramps, bleeding, and/or dizziness during and right after IUD placement.     To help minimize the cramps, you may take ibuprofen 600 mg with food. These symptoms should improve over the next 24 hours.  Mild cramping may be present for a few days after IUD placement. You may continue taking ibuprofen as directed if needed.    You may experience spotting or bleeding for the first few weeks to months after IUD placement.      Other side effects can include:  Anemia, hemorrhage, partial or complete expulsion of device, uterine or cervical perforation, embedding of IUD in the uterine wall, increased risk of pelvic inflammatory disease.  Women who become pregnant with an IUD in place are at higher risk of an ectopic pregnancy.  There is also a higher risk of miscarriage when pregnancy occurs with an IUD in place.    Use condoms or abstain from sex for 7 days after the insertion of your Mirena or Kyleena or Vida  IUD.  This is not necessary if you had a ParaGard IUD placed.    If you experience fever, chills, abdominal pain, worsening pelvic pain, dizziness, unusually heavy vaginal bleeding, suspected expulsion of device or foul smelling vaginal discharge please call the clinic for evaluation.    Please schedule an appointment at the clinic for a string check 4-6 weeks after IUD placement    Your periods may change (Vida/Kyleena/Mirena):    For the first 3 to 6 months, your monthly period may become irregular. You may also have frequent spotting or light bleeding. A few women have heavy bleeding during this time. After your body adjusts, the number of bleeding days is likely to decrease (but may remain irregular), and you may even find that your periods stop altogether for as long as Vida/Mirena is in place.  Your periods will return rapidly once the IUD is removed.      ParaGard IUD users:    ParaGard  IUD users may experience heavier than normal cycles while their IUD is in place, this is considered normal   Back-up contraception is not needed      Checking for your strings:    We encourage everyone with an IUD in place to check for their strings monthly    You may check your own IUD strings by inserting a finger into the vagina and feeling the strings as they exit the cervix.  The strings will initially feel firm, like fishing line, but will soften over a few weeks.  After the strings have softened, you or your partner should not be able to feel the strings during intercourse.     If the string length greatly changes or if you cannot feel your strings at all please make an appointment to see you midwife and use a backup method of contraception like a condom.    If you can feel something hard/plastic like the IUD may not be in the correct place. You should then see your healthcare provider to have the position confirmed with ultrasound.       Remember:    IUD's do not protect against HIV or STIs.  IUD's do not prevent the formation of ovarian cysts.  IUD's do not typically reduce acne or cause weight gain or mood changes.    For more information:  Http://www.mirena-us.com/    The ParaGard IUD is effective for 12 years.  The Vida IUD is effective for 3 years.  The Kyleena IUD is effective for 5 years.  The Mirena IUD is effective for 7 years.  Your IUD can easily be removed by a healthcare professional at any time.    Do not try to remove the IUD yourself.      If you have questions or concerns please call:    [facility]   [phone]

## 2023-05-11 ENCOUNTER — PATIENT OUTREACH (OUTPATIENT)
Dept: OBGYN | Facility: CLINIC | Age: 23
End: 2023-05-11
Payer: COMMERCIAL

## 2023-05-11 NOTE — TELEPHONE ENCOUNTER
Pt had a pap done at an outside facility in 2022 that was NIL so pap is not due at this time. Chart updated.  Pranav Argueta, HELENAN, RN  Pap Tracking Nurse

## 2023-11-07 ENCOUNTER — TELEPHONE (OUTPATIENT)
Dept: DERMATOLOGY | Facility: CLINIC | Age: 23
End: 2023-11-07
Payer: COMMERCIAL

## 2023-11-07 DIAGNOSIS — D23.9 DERMAL NEVUS: Primary | ICD-10-CM

## 2023-11-07 NOTE — TELEPHONE ENCOUNTER
Called and spoke with pt and told her how to request her records be sent to [place]. Referral sent now.    Thank you,  Betty BARRIOS RN  Dermatology   [phone]

## 2023-11-07 NOTE — TELEPHONE ENCOUNTER
Health Call Center    Phone Message    May a detailed message be left on voicemail: yes     Reason for Call: Form or Letter   Type or form/letter needing completion: referral and medical Recs information   Provider: Deidre Fermin  Date form needed: ASAP  Once completed: Fax form to:   [facility] pt's ID # [id number]  [facility] phone # [phone]  [facility] fax # [phone]    Pt has new insurance and will be needing this information and referral. Please include pt's id # listed above. Thanks     Action Taken: Message routed to:  Other: [facility]    Travel Screening: Not Applicable

## 2024-06-16 ENCOUNTER — HEALTH MAINTENANCE LETTER (OUTPATIENT)
Age: 24
End: 2024-06-16

## 2025-06-21 ENCOUNTER — HEALTH MAINTENANCE LETTER (OUTPATIENT)
Age: 25
End: 2025-06-21